# Patient Record
Sex: MALE | Race: WHITE | Employment: FULL TIME | URBAN - METROPOLITAN AREA
[De-identification: names, ages, dates, MRNs, and addresses within clinical notes are randomized per-mention and may not be internally consistent; named-entity substitution may affect disease eponyms.]

---

## 2017-02-01 DIAGNOSIS — R41.840 ATTENTION DEFICIT: ICD-10-CM

## 2017-02-01 RX ORDER — DEXTROAMPHETAMINE SACCHARATE, AMPHETAMINE ASPARTATE, DEXTROAMPHETAMINE SULFATE AND AMPHETAMINE SULFATE 7.5; 7.5; 7.5; 7.5 MG/1; MG/1; MG/1; MG/1
30 TABLET ORAL 2 TIMES DAILY
Qty: 60 TABLET | Refills: 0 | Status: SHIPPED | OUTPATIENT
Start: 2017-02-01 | End: 2017-03-04 | Stop reason: SDUPTHER

## 2017-02-20 RX ORDER — ZOLPIDEM TARTRATE 10 MG/1
TABLET ORAL
Qty: 30 TABLET | Refills: 0 | OUTPATIENT
Start: 2017-02-20

## 2017-03-02 DIAGNOSIS — R41.840 ATTENTION DEFICIT: ICD-10-CM

## 2017-03-02 RX ORDER — DEXTROAMPHETAMINE SACCHARATE, AMPHETAMINE ASPARTATE, DEXTROAMPHETAMINE SULFATE AND AMPHETAMINE SULFATE 7.5; 7.5; 7.5; 7.5 MG/1; MG/1; MG/1; MG/1
30 TABLET ORAL 2 TIMES DAILY
Qty: 60 TABLET | Refills: 0 | OUTPATIENT
Start: 2017-03-02

## 2017-03-04 ENCOUNTER — OFFICE VISIT (OUTPATIENT)
Dept: PRIMARY CARE CLINIC | Age: 37
End: 2017-03-04

## 2017-03-04 VITALS
HEIGHT: 68 IN | HEART RATE: 78 BPM | BODY MASS INDEX: 33.65 KG/M2 | WEIGHT: 222 LBS | DIASTOLIC BLOOD PRESSURE: 80 MMHG | TEMPERATURE: 98.2 F | RESPIRATION RATE: 16 BRPM | SYSTOLIC BLOOD PRESSURE: 122 MMHG

## 2017-03-04 DIAGNOSIS — R41.840 ATTENTION DEFICIT: ICD-10-CM

## 2017-03-04 PROCEDURE — 99213 OFFICE O/P EST LOW 20 MIN: CPT | Performed by: INTERNAL MEDICINE

## 2017-03-04 RX ORDER — DEXTROAMPHETAMINE SACCHARATE, AMPHETAMINE ASPARTATE, DEXTROAMPHETAMINE SULFATE AND AMPHETAMINE SULFATE 7.5; 7.5; 7.5; 7.5 MG/1; MG/1; MG/1; MG/1
30 TABLET ORAL 2 TIMES DAILY
Qty: 60 TABLET | Refills: 0 | Status: SHIPPED | OUTPATIENT
Start: 2017-03-04 | End: 2017-03-04 | Stop reason: SDUPTHER

## 2017-03-04 RX ORDER — ZOLPIDEM TARTRATE 10 MG/1
10 TABLET ORAL NIGHTLY PRN
Qty: 30 TABLET | Refills: 2 | Status: SHIPPED | OUTPATIENT
Start: 2017-03-04 | End: 2017-08-12 | Stop reason: SDUPTHER

## 2017-03-04 RX ORDER — DEXTROAMPHETAMINE SACCHARATE, AMPHETAMINE ASPARTATE, DEXTROAMPHETAMINE SULFATE AND AMPHETAMINE SULFATE 7.5; 7.5; 7.5; 7.5 MG/1; MG/1; MG/1; MG/1
30 TABLET ORAL 2 TIMES DAILY
Qty: 60 TABLET | Refills: 0 | Status: SHIPPED | OUTPATIENT
Start: 2017-03-04 | End: 2017-08-12 | Stop reason: SDUPTHER

## 2017-03-12 ASSESSMENT — ENCOUNTER SYMPTOMS
SHORTNESS OF BREATH: 0
NAUSEA: 0
PHOTOPHOBIA: 0
VOICE CHANGE: 0
CHOKING: 0
VOMITING: 0
TROUBLE SWALLOWING: 0

## 2017-03-23 ENCOUNTER — OFFICE VISIT (OUTPATIENT)
Dept: PRIMARY CARE CLINIC | Age: 37
End: 2017-03-23

## 2017-03-23 VITALS
SYSTOLIC BLOOD PRESSURE: 118 MMHG | BODY MASS INDEX: 33.04 KG/M2 | HEIGHT: 68 IN | HEART RATE: 72 BPM | DIASTOLIC BLOOD PRESSURE: 88 MMHG | TEMPERATURE: 97.7 F | RESPIRATION RATE: 14 BRPM | WEIGHT: 218 LBS

## 2017-03-23 DIAGNOSIS — R10.13 ABDOMINAL PAIN, EPIGASTRIC: ICD-10-CM

## 2017-03-23 DIAGNOSIS — M54.6 ACUTE MIDLINE THORACIC BACK PAIN: Primary | ICD-10-CM

## 2017-03-23 DIAGNOSIS — L71.9 ROSACEA: ICD-10-CM

## 2017-03-23 DIAGNOSIS — M54.6 ACUTE MIDLINE THORACIC BACK PAIN: ICD-10-CM

## 2017-03-23 LAB
ALBUMIN SERPL-MCNC: 4.7 G/DL (ref 3.9–4.9)
ALP BLD-CCNC: 81 U/L (ref 35–104)
ALT SERPL-CCNC: 83 U/L (ref 0–41)
AMYLASE: 60 U/L (ref 28–100)
ANION GAP SERPL CALCULATED.3IONS-SCNC: 13 MEQ/L (ref 7–13)
AST SERPL-CCNC: 30 U/L (ref 0–40)
BILIRUB SERPL-MCNC: 0.5 MG/DL (ref 0–1.2)
BUN BLDV-MCNC: 16 MG/DL (ref 6–20)
CALCIUM SERPL-MCNC: 9.6 MG/DL (ref 8.6–10.2)
CHLORIDE BLD-SCNC: 100 MEQ/L (ref 98–107)
CO2: 24 MEQ/L (ref 22–29)
CREAT SERPL-MCNC: 0.76 MG/DL (ref 0.7–1.2)
GFR AFRICAN AMERICAN: >60
GFR NON-AFRICAN AMERICAN: >60
GLOBULIN: 2.6 G/DL (ref 2.3–3.5)
GLUCOSE BLD-MCNC: 73 MG/DL (ref 74–109)
LIPASE: 47 U/L (ref 13–60)
POTASSIUM SERPL-SCNC: 4.3 MEQ/L (ref 3.5–5.1)
SODIUM BLD-SCNC: 137 MEQ/L (ref 132–144)
TOTAL PROTEIN: 7.3 G/DL (ref 6.4–8.1)

## 2017-03-23 PROCEDURE — 99214 OFFICE O/P EST MOD 30 MIN: CPT | Performed by: FAMILY MEDICINE

## 2017-03-23 RX ORDER — METHOCARBAMOL 500 MG/1
500 TABLET, FILM COATED ORAL EVERY EVENING
Qty: 30 TABLET | Refills: 3 | Status: SHIPPED | OUTPATIENT
Start: 2017-03-23 | End: 2017-04-22

## 2017-03-23 RX ORDER — METRONIDAZOLE 7.5 MG/G
GEL TOPICAL
Qty: 1 TUBE | Refills: 3 | Status: SHIPPED | OUTPATIENT
Start: 2017-03-23 | End: 2017-09-29

## 2017-03-23 ASSESSMENT — ENCOUNTER SYMPTOMS
DIARRHEA: 0
FACIAL SWELLING: 0
BACK PAIN: 1
STRIDOR: 0
NAUSEA: 0
PHOTOPHOBIA: 0
BOWEL INCONTINENCE: 0
EYE DISCHARGE: 0
CHEST TIGHTNESS: 0
EYE PAIN: 0
APNEA: 0
EYE REDNESS: 0
CHOKING: 0
WHEEZING: 0
CONSTIPATION: 0
ABDOMINAL PAIN: 0
COLOR CHANGE: 0

## 2017-06-06 DIAGNOSIS — R41.840 ATTENTION DEFICIT: ICD-10-CM

## 2017-06-06 RX ORDER — ZOLPIDEM TARTRATE 10 MG/1
10 TABLET ORAL NIGHTLY PRN
Qty: 30 TABLET | Refills: 2 | OUTPATIENT
Start: 2017-06-06

## 2017-06-06 RX ORDER — DEXTROAMPHETAMINE SACCHARATE, AMPHETAMINE ASPARTATE, DEXTROAMPHETAMINE SULFATE AND AMPHETAMINE SULFATE 7.5; 7.5; 7.5; 7.5 MG/1; MG/1; MG/1; MG/1
30 TABLET ORAL 2 TIMES DAILY
Qty: 60 TABLET | Refills: 0 | OUTPATIENT
Start: 2017-06-06

## 2017-08-12 ENCOUNTER — OFFICE VISIT (OUTPATIENT)
Dept: PRIMARY CARE CLINIC | Age: 37
End: 2017-08-12

## 2017-08-12 VITALS
WEIGHT: 217.8 LBS | TEMPERATURE: 98.6 F | BODY MASS INDEX: 33.01 KG/M2 | SYSTOLIC BLOOD PRESSURE: 118 MMHG | RESPIRATION RATE: 14 BRPM | HEIGHT: 68 IN | OXYGEN SATURATION: 98 % | DIASTOLIC BLOOD PRESSURE: 80 MMHG | HEART RATE: 77 BPM

## 2017-08-12 DIAGNOSIS — R41.840 ATTENTION DEFICIT: ICD-10-CM

## 2017-08-12 DIAGNOSIS — M79.7 FIBROMYALGIA: ICD-10-CM

## 2017-08-12 DIAGNOSIS — R07.81 RIB PAIN ON RIGHT SIDE: Primary | ICD-10-CM

## 2017-08-12 DIAGNOSIS — G56.01 CARPAL TUNNEL SYNDROME, RIGHT: ICD-10-CM

## 2017-08-12 PROCEDURE — 99214 OFFICE O/P EST MOD 30 MIN: CPT | Performed by: INTERNAL MEDICINE

## 2017-08-12 RX ORDER — DEXTROAMPHETAMINE SACCHARATE, AMPHETAMINE ASPARTATE, DEXTROAMPHETAMINE SULFATE AND AMPHETAMINE SULFATE 7.5; 7.5; 7.5; 7.5 MG/1; MG/1; MG/1; MG/1
30 TABLET ORAL 2 TIMES DAILY
Qty: 60 TABLET | Refills: 0 | Status: SHIPPED | OUTPATIENT
Start: 2017-08-12 | End: 2017-09-29 | Stop reason: SDUPTHER

## 2017-08-12 RX ORDER — DEXTROAMPHETAMINE SACCHARATE, AMPHETAMINE ASPARTATE, DEXTROAMPHETAMINE SULFATE AND AMPHETAMINE SULFATE 7.5; 7.5; 7.5; 7.5 MG/1; MG/1; MG/1; MG/1
30 TABLET ORAL 2 TIMES DAILY
Qty: 60 TABLET | Refills: 0 | Status: SHIPPED | OUTPATIENT
Start: 2017-08-12 | End: 2017-08-12 | Stop reason: SDUPTHER

## 2017-08-12 RX ORDER — PREGABALIN 100 MG/1
100 CAPSULE ORAL 2 TIMES DAILY
Qty: 60 CAPSULE | Refills: 2 | Status: SHIPPED | OUTPATIENT
Start: 2017-08-12 | End: 2017-09-29

## 2017-08-12 RX ORDER — ZOLPIDEM TARTRATE 10 MG/1
10 TABLET ORAL NIGHTLY PRN
Qty: 30 TABLET | Refills: 2 | Status: SHIPPED | OUTPATIENT
Start: 2017-08-12 | End: 2017-10-04 | Stop reason: SDUPTHER

## 2017-08-12 RX ORDER — LIDOCAINE 50 MG/G
1 PATCH TOPICAL DAILY
Qty: 30 PATCH | Refills: 1 | Status: SHIPPED | OUTPATIENT
Start: 2017-08-12 | End: 2017-10-09 | Stop reason: SDUPTHER

## 2017-08-12 RX ORDER — DEXTROAMPHETAMINE SACCHARATE, AMPHETAMINE ASPARTATE, DEXTROAMPHETAMINE SULFATE AND AMPHETAMINE SULFATE 7.5; 7.5; 7.5; 7.5 MG/1; MG/1; MG/1; MG/1
30 TABLET ORAL 2 TIMES DAILY
Qty: 60 TABLET | Refills: 0 | Status: SHIPPED | OUTPATIENT
Start: 2017-08-12 | End: 2017-10-04 | Stop reason: SDUPTHER

## 2017-08-12 ASSESSMENT — PATIENT HEALTH QUESTIONNAIRE - PHQ9
1. LITTLE INTEREST OR PLEASURE IN DOING THINGS: 0
2. FEELING DOWN, DEPRESSED OR HOPELESS: 0
SUM OF ALL RESPONSES TO PHQ QUESTIONS 1-9: 0
SUM OF ALL RESPONSES TO PHQ9 QUESTIONS 1 & 2: 0

## 2017-08-13 ASSESSMENT — ENCOUNTER SYMPTOMS
TROUBLE SWALLOWING: 0
VOMITING: 0
PHOTOPHOBIA: 0
VOICE CHANGE: 0
SHORTNESS OF BREATH: 0
CHOKING: 0
NAUSEA: 0

## 2017-09-29 ENCOUNTER — OFFICE VISIT (OUTPATIENT)
Dept: PHYSICAL MEDICINE AND REHAB | Age: 37
End: 2017-09-29

## 2017-09-29 ENCOUNTER — HOSPITAL ENCOUNTER (OUTPATIENT)
Dept: GENERAL RADIOLOGY | Age: 37
Discharge: HOME | End: 2017-09-29
Payer: COMMERCIAL

## 2017-09-29 VITALS
WEIGHT: 211 LBS | HEIGHT: 68 IN | DIASTOLIC BLOOD PRESSURE: 70 MMHG | SYSTOLIC BLOOD PRESSURE: 110 MMHG | BODY MASS INDEX: 31.98 KG/M2

## 2017-09-29 DIAGNOSIS — M54.40 CHRONIC LOW BACK PAIN WITH SCIATICA, SCIATICA LATERALITY UNSPECIFIED, UNSPECIFIED BACK PAIN LATERALITY: Primary | ICD-10-CM

## 2017-09-29 DIAGNOSIS — M54.2 NECK PAIN: ICD-10-CM

## 2017-09-29 DIAGNOSIS — G47.01 INSOMNIA SECONDARY TO CHRONIC PAIN: ICD-10-CM

## 2017-09-29 DIAGNOSIS — R10.13 ABDOMINAL PAIN, EPIGASTRIC: ICD-10-CM

## 2017-09-29 DIAGNOSIS — M54.17 LUMBOSACRAL RADICULOPATHY AT L5: ICD-10-CM

## 2017-09-29 DIAGNOSIS — G89.29 CHRONIC PAIN OF LEFT KNEE: ICD-10-CM

## 2017-09-29 DIAGNOSIS — F41.1 ANXIETY STATE: ICD-10-CM

## 2017-09-29 DIAGNOSIS — G89.29 CHRONIC BILATERAL THORACIC BACK PAIN: ICD-10-CM

## 2017-09-29 DIAGNOSIS — G54.0 TOS (THORACIC OUTLET SYNDROME): ICD-10-CM

## 2017-09-29 DIAGNOSIS — M25.562 CHRONIC PAIN OF LEFT KNEE: ICD-10-CM

## 2017-09-29 DIAGNOSIS — Z79.899 MEDICAL MARIJUANA USE: ICD-10-CM

## 2017-09-29 DIAGNOSIS — E55.9 VITAMIN D DEFICIENCY: ICD-10-CM

## 2017-09-29 DIAGNOSIS — M51.36 DDD (DEGENERATIVE DISC DISEASE), LUMBAR: ICD-10-CM

## 2017-09-29 DIAGNOSIS — M54.6 CHRONIC BILATERAL THORACIC BACK PAIN: ICD-10-CM

## 2017-09-29 DIAGNOSIS — G89.29 INSOMNIA SECONDARY TO CHRONIC PAIN: ICD-10-CM

## 2017-09-29 DIAGNOSIS — M54.40 CHRONIC LOW BACK PAIN WITH SCIATICA, SCIATICA LATERALITY UNSPECIFIED, UNSPECIFIED BACK PAIN LATERALITY: ICD-10-CM

## 2017-09-29 DIAGNOSIS — Z79.899 HIGH RISK MEDICATION USE: ICD-10-CM

## 2017-09-29 DIAGNOSIS — G89.29 CHRONIC LOW BACK PAIN WITH SCIATICA, SCIATICA LATERALITY UNSPECIFIED, UNSPECIFIED BACK PAIN LATERALITY: Primary | ICD-10-CM

## 2017-09-29 DIAGNOSIS — M53.3 SI (SACROILIAC) PAIN: ICD-10-CM

## 2017-09-29 DIAGNOSIS — F98.8 ATTENTION DEFICIT DISORDER: ICD-10-CM

## 2017-09-29 DIAGNOSIS — G89.29 CHRONIC LOW BACK PAIN WITH SCIATICA, SCIATICA LATERALITY UNSPECIFIED, UNSPECIFIED BACK PAIN LATERALITY: ICD-10-CM

## 2017-09-29 LAB
HCT VFR BLD CALC: 45.4 % (ref 42–52)
HEMOGLOBIN: 15.2 G/DL (ref 14–18)
MCH RBC QN AUTO: 29.6 PG (ref 27–31.3)
MCHC RBC AUTO-ENTMCNC: 33.5 % (ref 33–37)
MCV RBC AUTO: 88.3 FL (ref 80–100)
PDW BLD-RTO: 13.1 % (ref 11.5–14.5)
PLATELET # BLD: 287 K/UL (ref 130–400)
RBC # BLD: 5.14 M/UL (ref 4.7–6.1)
VITAMIN D 25-HYDROXY: 21.4 NG/ML (ref 30–100)
WBC # BLD: 8.5 K/UL (ref 4.8–10.8)

## 2017-09-29 PROCEDURE — 96372 THER/PROPH/DIAG INJ SC/IM: CPT | Performed by: PHYSICAL MEDICINE & REHABILITATION

## 2017-09-29 PROCEDURE — 72100 X-RAY EXAM L-S SPINE 2/3 VWS: CPT

## 2017-09-29 PROCEDURE — 72070 X-RAY EXAM THORAC SPINE 2VWS: CPT

## 2017-09-29 PROCEDURE — 72040 X-RAY EXAM NECK SPINE 2-3 VW: CPT

## 2017-09-29 PROCEDURE — 99205 OFFICE O/P NEW HI 60 MIN: CPT | Performed by: PHYSICAL MEDICINE & REHABILITATION

## 2017-09-29 RX ORDER — FLUTICASONE PROPIONATE 50 MCG
2 SPRAY, SUSPENSION (ML) NASAL NIGHTLY
COMMUNITY
Start: 2017-09-20 | End: 2017-11-03 | Stop reason: ALTCHOICE

## 2017-09-29 RX ORDER — KETOROLAC TROMETHAMINE 30 MG/ML
30 INJECTION, SOLUTION INTRAMUSCULAR; INTRAVENOUS ONCE
Status: COMPLETED | OUTPATIENT
Start: 2017-09-29 | End: 2017-09-29

## 2017-09-29 RX ORDER — AMOXICILLIN 875 MG/1
875 TABLET, COATED ORAL
COMMUNITY
Start: 2017-09-20 | End: 2017-09-30

## 2017-09-29 RX ORDER — TOPIRAMATE 25 MG/1
TABLET ORAL
Qty: 60 TABLET | Refills: 3 | Status: SHIPPED | OUTPATIENT
Start: 2017-09-29 | End: 2018-01-03

## 2017-09-29 RX ORDER — METHOCARBAMOL 500 MG/1
500 TABLET, FILM COATED ORAL 2 TIMES DAILY PRN
COMMUNITY
End: 2017-11-03 | Stop reason: ALTCHOICE

## 2017-09-29 RX ORDER — BENZONATATE 100 MG/1
100-200 CAPSULE ORAL
COMMUNITY
Start: 2017-09-20 | End: 2017-11-03 | Stop reason: ALTCHOICE

## 2017-09-29 RX ADMIN — KETOROLAC TROMETHAMINE 30 MG: 30 INJECTION, SOLUTION INTRAMUSCULAR; INTRAVENOUS at 12:09

## 2017-09-29 ASSESSMENT — ENCOUNTER SYMPTOMS
NAUSEA: 0
SHORTNESS OF BREATH: 0
CONSTIPATION: 0
ABDOMINAL PAIN: 0
APNEA: 0
VOMITING: 0
BACK PAIN: 1
WHEEZING: 0
EYES NEGATIVE: 1
DIARRHEA: 0
CHEST TIGHTNESS: 0

## 2017-09-29 NOTE — MR AVS SNAPSHOT
After Visit Summary             Matt Cifuentes   2017 10:30 AM   Office Visit    Description:  Male : 1980   Provider:  Dylan Dickerson DO   Department:  [facility]              Your Follow-Up and Future Appointments         Below is a list of your follow-up and future appointments. This may not be a complete list as you may have made appointments directly with providers that we are not aware of or your providers may have made some for you. Please call your providers to confirm appointments. It is important to keep your appointments. Please bring your current insurance card, photo ID, co-pay, and all medication bottles to your appointment. If self-pay, payment is expected at the time of service. Your To-Do List     Future Appointments Provider Department Dept Phone    10/2/2017 1:00 PM Taisha Bueno [facility] [phone]    10/17/2017 10:45 AM Dylan Dickerson DO [facility] [phone]    2017 11:00 AM Dylan Dickerson DO [facility] [phone]    11/3/2017 9:30 AM Dylan Dickerson DO [facility] [phone]    Please arrive 15 minutes prior to appointment time, bring insurance card and photo ID.      Future Orders Complete By Expires    CBC [TWE555 Custom]  2017    HLA-B27 Antigen [[id number] Custom]  2017    MRI LUMBAR SPINE WO CONTRAST [40874 Custom]  2017    Pain Management Drug Screen [[id number] Custom]  2017    Protein Electrophoresis, Serum [[id number] Custom]  2017    XR CERVICAL SPINE (2-3 VIEWS) [[id number] Custom]  2017    XR LUMBAR SPINE (2-3 VIEWS) [[id number] Custom]  2017    XR THORACIC SPINE (2 VIEWS) [39133 Custom]  2017    Vitamin D 25 Hydroxy [HGB455 Custom]  2017    Comments: Please have this blood test done two months from when it was ordered-  Have your blood drawn approximately two months from the order date above (in the top right corner of this lab slip). Information from Your Visit        Department     Name Address Phone Fax    [facility] [address] [facility] [id number] [phone]      You Were Seen for:         Comments    Chronic low back pain with sciatica, sciatica laterality unspecified, unspecified back pain laterality   [[id number]]         Vital Signs     Blood Pressure Height Weight Body Mass Index Smoking Status       110/70 5' 8\" (1.727 m) 211 lb (95.7 kg) 32.08 kg/m2 Former Smoker       Additional Information about your Body Mass Index (BMI)           Your BMI as listed above is considered obese (30 or more). BMI is an estimate of body fat, calculated from your height and weight. The higher your BMI, the greater your risk of heart disease, high blood pressure, type 2 diabetes, stroke, gallstones, arthritis, sleep apnea, and certain cancers. BMI is not perfect. It may overestimate body fat in athletes and people who are more muscular. Even a small weight loss (between 5 and 10 percent of your current weight) by decreasing your calorie intake and becoming more physically active will help lower your risk of developing or worsening diseases associated with obesity. Learn more at: Adomik.co.uk             Today's Medication Changes          These changes are accurate as of: 9/29/17 12:01 PM.  If you have any questions, ask your nurse or doctor. START taking these medications           topiramate 25 MG tablet   Commonly known as:  TOPAMAX   Instructions:   Take one or two nightly as tolerated for pain   Quantity:  60 tablet   Refills:  3   Started by:  Michelle Verdin, DO       traMADol-acetaminophen 37.5-325 MG per tablet   Commonly known as:  ULTRACET 2-3 TIMES A WEEK FOR 6-8 WEEKS    ALWAYS TRANSITION TO A HOME EXERCISE PROGRAM WITH WRITTEN AND VERBAL INSTRUCTIONS. Comments:    CALL [phone] FOR AN APPOINTMENT  Please schedule with [facility] at the [facility] [place] [facility] [address]. Send over the lab, xrays, and a copy of my last note. Additional Information        Basic Information     Date Of Birth Sex Race Ethnicity Preferred Language    1980 Male White Non-/Non  English      Problem List as of 9/29/2017  Date Reviewed: 9/29/2017                Back pain OARRS PM&R 5/17/17, 09/28/17 OARRS PM&R    Chronic pain of left knee    Neck pain    DDD (degenerative disc disease), lumbar    TOS (thoracic outlet syndrome)    Abdominal pain, epigastric    Attention deficit    Anxiety state    Attention deficit disorder      Immunizations as of 9/29/2017     Name Date    Influenza Virus Vaccine 11/5/2014    Influenza, [place], 3 yrs and older, IM, Preservative Free 8/10/2016    PPD Test 3/4/2013      Preventive Care        Date Due    HIV screening is recommended for all people regardless of risk factors  aged 15-65 years at least once (lifetime) who have never been HIV tested. 5/9/1994    Tetanus Combination Vaccine (1 - Tdap) 5/9/1998    Yearly Flu Vaccine (1) 9/1/2017            DocSea Signup           DocSea allows you to send messages to your doctor, view your test results, renew your prescriptions, schedule appointments, view visit notes, and more. How Do I Sign Up? 1. In your Internet browser, go to [email or web address]  2. Click on the Sign Up Now link in the Sign In box. You will see the New Member Sign Up page. 3. Enter your DocSea Access Code exactly as it appears below. You will not need to use this code after youve completed the sign-up process. If you do not sign up before the expiration date, you must request a new code.   DocSea Access Code: [email or web address]  Expires: 11/28/2017  9:44 AM

## 2017-09-29 NOTE — PROGRESS NOTES
Subjective  Jesica Bernal, 44 y.o. male presents today with:    Establish Care Patient here for evaluation and treatment plan/options for neck and upper back pain. Back Pain     Neck Pain     Numbness down left leg       Neck Pain    This is a chronic problem. The current episode started more than 1 year ago. The problem occurs constantly. The pain is associated with nothing. The pain is present in the occipital region. The quality of the pain is described as aching. The pain is at a severity of 8/10. The pain is severe. The symptoms are aggravated by bending. Stiffness is present in the morning. Associated symptoms include numbness, tingling and weakness. Pertinent negatives include no chest pain or headaches. He has tried chiropractic manipulation, heat, home exercises, acetaminophen, ice and NSAIDs for the symptoms. The treatment provided mild relief. Back Pain   This is a chronic problem. The current episode started more than 1 year ago. The problem occurs constantly. The problem is unchanged. The pain is present in the gluteal. The pain radiates to the left knee, left thigh and left foot. The pain is at a severity of 8/10. The pain is severe. The pain is worse during the day. The symptoms are aggravated by bending, sitting, twisting and standing. Associated symptoms include numbness, tingling and weakness. Pertinent negatives include no abdominal pain, chest pain or headaches. Risk factors include lack of exercise, obesity, poor posture and sedentary lifestyle. He has tried analgesics, heat, chiropractic manipulation, home exercises and NSAIDs for the symptoms. The treatment provided mild relief.        Past Medical History:   Diagnosis Date    Abdominal pain, epigastric 3/23/2017    ADHD (attention deficit hyperactivity disorder)     Anxiety state, unspecified     [facility] ([facility]     Carpal tunnel syndrome, bilateral     emg pos    Fibromyalgia     dr Rufus Brooke    Insomnia, swelling, no edema, no deformity, no laceration and normal pulse. Right upper arm: Normal.        Left upper arm: Normal.        Right forearm: Normal.        Left forearm: Normal.        Right hand: Normal.        Left hand: Normal.        Right upper leg: Normal.        Left upper leg: Normal.        Right lower leg: Normal.        Left lower leg: Normal.        Right foot: Normal.        Left foot: Normal.   Tender areas are indicated by numbered spot         Lymphadenopathy:     He has no cervical adenopathy. He has no axillary adenopathy. Right: No inguinal adenopathy present. Left: No inguinal adenopathy present. Neurological: He is alert and oriented to person, place, and time. He has normal strength. He displays abnormal reflex. He displays no atrophy and no tremor. A sensory deficit is present. No cranial nerve deficit. He exhibits normal muscle tone. Gait abnormal. Coordination normal. He displays no Babinski's sign on the right side. He displays no Babinski's sign on the left side. Reflex Scores:       Tricep reflexes are 2+ on the right side and 2+ on the left side. Bicep reflexes are 2+ on the right side and 2+ on the left side. Brachioradialis reflexes are 2+ on the right side and 2+ on the left side. Patellar reflexes are 2+ on the right side and 2+ on the left side. Achilles reflexes are 1+ on the right side and 1+ on the left side. Skin: Skin is warm, dry and intact. No abrasion, no bruising, no ecchymosis, no laceration, no petechiae and no rash noted. Rash is not macular, not pustular and not urticarial. He is not diaphoretic. No cyanosis or erythema. No pallor. Nails show no clubbing. Psychiatric: He has a normal mood and affect. His behavior is normal. Judgment and thought content normal. His mood appears not anxious. His affect is not angry, not blunt, not labile and not inappropriate.  His speech is not rapid and/or pressured, not delayed, 5. Abdominal pain, epigastric     6. Insomnia secondary to chronic pain     7. Neck pain  XR CERVICAL SPINE (2-3 VIEWS)   8. DDD (degenerative disc disease), lumbar  XR LUMBAR SPINE (2-3 VIEWS)    Protein Electrophoresis, Serum    HLA-B27 Antigen    OT manual therapy   9. High risk medication use  Pain Management Drug Screen   10. Vitamin D deficiency  Vitamin D 25 Hydroxy    OT manual therapy   11. Chronic bilateral thoracic back pain  XR THORACIC SPINE (2 VIEWS)    OT manual therapy    traMADol-acetaminophen (ULTRACET) 37.5-325 MG per tablet   12. Lumbosacral radiculopathy at L5  MRI LUMBAR SPINE WO CONTRAST    Protein Electrophoresis, Serum    HLA-B27 Antigen    OT manual therapy    topiramate (TOPAMAX) 25 MG tablet    traMADol-acetaminophen (ULTRACET) 37.5-325 MG per tablet   13. TOS (thoracic outlet syndrome)  OT manual therapy    topiramate (TOPAMAX) 25 MG tablet    traMADol-acetaminophen (ULTRACET) 37.5-325 MG per tablet       I am also concerned by lifestyle and mood issues including:    Past Medical History:   Diagnosis Date    Abdominal pain, epigastric 3/23/2017    ADHD (attention deficit hyperactivity disorder)     Anxiety state, unspecified     [facility]     Carpal tunnel syndrome, bilateral     emg pos    Fibromyalgia     dr Ashley Ruano    Insomnia, unspecified            Given their medication, chronic pain and lifestyle and medications they are at risk for :    Falls, constipation, addiction  Loss of livelyhood due to severe pain, debility, weight gain and  vitamin D deficiency    The patient was educated regarding proper diet, fitness routine, and regulatory restrictions concerning pain medications. Previous notes, comprehensive past medical, surgical, family history, and diagnostics were reviewed. Patient education and councelling were provided regarding off label use,treatment options and medication and injection risks.     Current and old [id number] [place] [address] Automated Prescription Reporting System) records reviewed, all refills reviewed since last visit,  Behavioral agreement/[facility] regulations   and Toxicology screen was reviewed with patient and is up to date. There are no current red flags. They are making good progress regarding pain relief, they are performing at a functional level regarding activities of daily living and psychological functioning, they're not having any adverse effects or side effects from the current medications, and I see no findings of aberrant drug taking her addiction related behaviors. Attestation: The Prescription Monitoring Report for this patient was reviewed today. (Maria Antonia Pagan DO)  Documentation: Possible medication side effects, risk of tolerance and/or dependence, and alternative treatments discussed., No signs of potential drug abuse or diversion identified. (Maria Antonia Pagan DO)       Patient is currently taking:       I have discontinued Mr. Pantoja's metroNIDAZOLE and pregabalin. I am also having him start on topiramate and traMADol-acetaminophen. Additionally, I am having him maintain his lidocaine, zolpidem, amphetamine-dextroamphetamine, amoxicillin, benzonatate, fluticasone, and methocarbamol. I also recommend the following Medications:    Orders Placed This Encounter   Medications    topiramate (TOPAMAX) 25 MG tablet     Sig: Take one or two nightly as tolerated for pain     Dispense:  60 tablet     Refill:  3    traMADol-acetaminophen (ULTRACET) 37.5-325 MG per tablet     Sig: Take one half or one tablet every 6 hours as needed for pain. Maximum of 2 tablets daily. Do not get narcotic pain medication from any other providers. Generic equivalent acceptable, unless otherwise noted. Dispense:  40 tablet     Refill:  0        -which helps with pain and function. Otherwise, continue the current pain medications that I have prescibed.       Radiologic:   Old films reviewed   EXAMINATION LUMBOSACRAL SPINE, 2 VIEWS       CLINICAL HISTORY LOW BACK PAIN       COMPARISONS None available.       FINDINGS AP and lateral views of the lumbosacral spine demonstrate a   grade 1 spondylolisthesis of L5 on S1. There is narrowing of the L5/S1   disc space compatible with degenerative disc disease at this level. There are no bone erosions, osteolytic or osteoblastic lesions   involving the lumbar spine.       IMPRESSION    1. GRADE 1 SPONDYLOLISTHESIS OF L5 ON S1.       2. NARROWED L5/S1 DISC SPACE SUGGESTING DEGENERATIVE DISC DISEASE AT   L5/S1.            -  RADWHERE        Read By- Megan Chinchilla Ip   Released By- Megan Chinchilla Ip   Released Date Time- 08/13/15 1154     ,  EXAMINATION CERVICAL SPINE, FOUR VIEWS       CLINICAL HISTORY NECK PAIN.       COMPARISONS 1/4/07.       FINDINGS There is a normal lordotic cervical curvature. No fracture or   other acute bone lesion is evident. There is very slight anterior   hypertrophic lipping at the anterior inferior aspect of C5. Disc space   heights are maintained.       On lateral projection, C7 is partially obscured by the shoulders. A   swimmer's view was obtained, but is compromised, due to motion   artifact.       IMPRESSION        NEGATIVE CERVICAL SPINE. I discussed results with patients. see Follow up plans below  For any new studies. Care Everywhere Updates:  requested and reviewed. No new issues noted. Electrodiagnostic:  Previous studies requested     I discussed results with patient. See follow-up plans for new studies.         Labs:  Previous labs reviewed     Lab Results   Component Value Date     03/23/2017    K 4.3 03/23/2017     03/23/2017    CO2 24 03/23/2017    BUN 16 03/23/2017    CREATININE 0.76 03/23/2017    CALCIUM 9.6 03/23/2017    LABALBU 4.7 03/23/2017    BILITOT 0.5 03/23/2017    ALKPHOS 81 03/23/2017    AST 30 03/23/2017    ALT 83 03/23/2017     Lab Results Component Value Date    WBC 9.0 07/14/2016    RBC 5.30 07/14/2016    HGB 15.5 07/14/2016    HCT 46.2 07/14/2016    MCV 87.2 07/14/2016    MCH 29.3 07/14/2016    MCHC 33.6 07/14/2016    RDW 13.0 07/14/2016     07/14/2016       Lab Results   Component Value Date    LABAMPH POSITIVE 07/14/2016    BARBSCNU Neg 07/14/2016    LABBENZ Neg 07/14/2016    CANSU Neg 07/14/2016    COCAIMETSCRU Neg 07/14/2016    PHENCYCLIDINESCREENURINE Neg 07/14/2016    DSCOMMENT see below 07/14/2016       No results found for: CODEINE, MORPHINE, ACETYLMORPHI, OXYCODONE, NOROXYCODONE, NOROXYMU, HYDRCO, NORHYDU, HYDROMO, BUPREN, NORBUPRNOR, FENTA, NORFENT, MEPERIDINE, TAPENU, TAPOSULFUR, METHADONE, LABPROP, TRAM, AMPH, METHAMP, MDMA, ECMDA    No results found for: METPHEN, PHENTERMINE, BENZOYL, ALPRAZ, ALPHAOHALPRA, CLONAZEPAM, 7AMINOCLONAZ, DIAZEP, ROSENDO, OXAZ, TEMAZ, LORAZEPAM, MIDAZOLAM, ZOLPIDEM, CORI, ETG, MARIJMET, PCP, PAINMGTDRUGP, EERPAINMGTPA, LABCREA      , I discussed results with patient. See follow-up plans for new studies. Therapies:  HEP-gentle stretching and relaxation techniques-demonstrated with patient-they are to do them twice a day. They are also advised to make the following lifestyle changes:  Goals     None          Injections or Epidurals:  Injection options were discussed. Patient gave verbal consent to ordered injections. See follow-up plans for planned injections. Supplements:  Vitamin D,   Education was given on:   Dietary and Fitness             Follow up with Primary Care Physician regarding their general medical needs. They are to follow up in 2 months to review medication, efficacy of injections, pill counts, OARRS check, SOAPPR assessment, review diagnostics, to review previous and future treatment plans and assess appropriateness for continued therapy.         New Diagnostics  Orders Placed This Encounter   Procedures    XR CERVICAL SPINE (2-3 VIEWS)    XR LUMBAR SPINE (2-3 VIEWS)    XR THORACIC SPINE (2 VIEWS)    MRI LUMBAR SPINE WO CONTRAST    CBC    Pain Management Drug Screen    Vitamin D 25 Hydroxy    Protein Electrophoresis, Serum    HLA-B27 Antigen    OT manual therapy       Archana Phipps DO

## 2017-10-01 LAB
6-ACETYLMORPHINE: NOT DETECTED
7-AMINOCLONAZEPAM: NOT DETECTED
ALBUMIN SERPL-MCNC: 4.61 G/DL (ref 3.75–5.01)
ALPHA-1-GLOBULIN: 0.33 G/DL (ref 0.19–0.46)
ALPHA-2-GLOBULIN: 0.67 G/DL (ref 0.48–1.05)
ALPHA-OH-ALPRAZOLAM: NOT DETECTED
ALPRAZOLAM: NOT DETECTED
AMPHETAMINE: PRESENT
BARBITURATES: NOT DETECTED
BENZOYLECGONINE: NOT DETECTED
BETA GLOBULIN: 1.13 G/DL (ref 0.48–1.1)
BUPRENORPHINE: NOT DETECTED
CARISOPRODOL: NOT DETECTED
CLONAZEPAM: NOT DETECTED
CODEINE: NOT DETECTED
CREATININE URINE: 132.6 MG/DL (ref 20–400)
DIAZEPAM: NOT DETECTED
EER PAIN MGT DRUG PANEL, HIGH RES/EMIT U: NORMAL
ETHYL GLUCURONIDE: NOT DETECTED
FENTANYL: NOT DETECTED
GAMMA GLOBULIN: 1.15 G/DL (ref 0.62–1.51)
HLA B27: POSITIVE
HYDROCODONE: NOT DETECTED
HYDROMORPHONE: NOT DETECTED
LORAZEPAM: NOT DETECTED
MARIJUANA METABOLITE: PRESENT
MDA: NOT DETECTED
MDEA: NOT DETECTED
MDMA URINE: NOT DETECTED
MEPERIDINE: NOT DETECTED
METHADONE: NOT DETECTED
METHAMPHETAMINE: NOT DETECTED
METHYLPHENIDATE: NOT DETECTED
MIDAZOLAM: NOT DETECTED
MORPHINE: NOT DETECTED
NORBUPRENORPHINE, FREE: NOT DETECTED
NORDIAZEPAM: NOT DETECTED
NORFENTANYL: NOT DETECTED
NORHYDROCODONE, URINE: NOT DETECTED
NOROXYCODONE: NOT DETECTED
NOROXYMORPHONE, URINE: NOT DETECTED
OXAZEPAM: NOT DETECTED
OXYCODONE: NOT DETECTED
OXYMORPHONE: NOT DETECTED
PAIN MANAGEMENT DRUG PANEL: NORMAL
PCP: NOT DETECTED
PHENTERMINE: NOT DETECTED
PROPOXYPHENE: NOT DETECTED
PROTEIN ELECTROPHORESIS, SERUM: ABNORMAL
SPE/IFE INTERPRETATION: ABNORMAL
TAPENTADOL, URINE: NOT DETECTED
TAPENTADOL-O-SULFATE, URINE: NOT DETECTED
TEMAZEPAM: NOT DETECTED
TOTAL PROTEIN: 7.9 G/DL (ref 6–8.3)
TRAMADOL: NOT DETECTED
ZOLPIDEM: NOT DETECTED

## 2017-10-02 ENCOUNTER — PROCEDURE VISIT (OUTPATIENT)
Dept: PHYSICAL MEDICINE AND REHAB | Age: 37
End: 2017-10-02

## 2017-10-02 DIAGNOSIS — M54.32 SCIATICA OF LEFT SIDE: Primary | ICD-10-CM

## 2017-10-02 PROCEDURE — 64445 NJX AA&/STRD SCIATIC NRV IMG: CPT | Performed by: PHYSICAL MEDICINE & REHABILITATION

## 2017-10-04 ENCOUNTER — OFFICE VISIT (OUTPATIENT)
Dept: PRIMARY CARE CLINIC | Age: 37
End: 2017-10-04

## 2017-10-04 VITALS
HEIGHT: 68 IN | WEIGHT: 211 LBS | TEMPERATURE: 97.6 F | RESPIRATION RATE: 16 BRPM | HEART RATE: 72 BPM | DIASTOLIC BLOOD PRESSURE: 82 MMHG | BODY MASS INDEX: 31.98 KG/M2 | SYSTOLIC BLOOD PRESSURE: 118 MMHG

## 2017-10-04 DIAGNOSIS — R41.840 ATTENTION DEFICIT: Primary | ICD-10-CM

## 2017-10-04 DIAGNOSIS — G47.00 INSOMNIA, UNSPECIFIED TYPE: ICD-10-CM

## 2017-10-04 DIAGNOSIS — Z23 NEED FOR INFLUENZA VACCINATION: ICD-10-CM

## 2017-10-04 PROCEDURE — 90688 IIV4 VACCINE SPLT 0.5 ML IM: CPT | Performed by: INTERNAL MEDICINE

## 2017-10-04 PROCEDURE — 99213 OFFICE O/P EST LOW 20 MIN: CPT | Performed by: INTERNAL MEDICINE

## 2017-10-04 PROCEDURE — 90471 IMMUNIZATION ADMIN: CPT | Performed by: INTERNAL MEDICINE

## 2017-10-04 RX ORDER — DEXTROAMPHETAMINE SACCHARATE, AMPHETAMINE ASPARTATE, DEXTROAMPHETAMINE SULFATE AND AMPHETAMINE SULFATE 7.5; 7.5; 7.5; 7.5 MG/1; MG/1; MG/1; MG/1
30 TABLET ORAL 2 TIMES DAILY
Qty: 60 TABLET | Refills: 0 | Status: SHIPPED | OUTPATIENT
Start: 2017-10-04 | End: 2017-10-04 | Stop reason: SDUPTHER

## 2017-10-04 RX ORDER — DEXTROAMPHETAMINE SACCHARATE, AMPHETAMINE ASPARTATE, DEXTROAMPHETAMINE SULFATE AND AMPHETAMINE SULFATE 7.5; 7.5; 7.5; 7.5 MG/1; MG/1; MG/1; MG/1
30 TABLET ORAL 2 TIMES DAILY
Qty: 60 TABLET | Refills: 0 | Status: SHIPPED | OUTPATIENT
Start: 2017-10-04 | End: 2017-11-03 | Stop reason: SDUPTHER

## 2017-10-04 RX ORDER — ZOLPIDEM TARTRATE 10 MG/1
10 TABLET ORAL NIGHTLY PRN
Qty: 30 TABLET | Refills: 2 | Status: SHIPPED | OUTPATIENT
Start: 2017-10-04 | End: 2017-10-09 | Stop reason: SDUPTHER

## 2017-10-04 NOTE — PROGRESS NOTES
Dilma Schultz 44 y.o. male presents today with   Chief Complaint   Patient presents with    Results     Presents today for f/u on X-ray results done on 9/29/17       Mental Health Problem   The primary symptoms include dysphoric mood. The current episode started more than 1 month ago. This is a recurrent problem. The onset of the illness is precipitated by emotional stress. The degree of incapacity that he is experiencing as a consequence of his illness is mild. Additional symptoms of the illness include anhedonia, insomnia, agitation and distractible. Additional symptoms of the illness do not include fatigue. He does not admit to suicidal ideas. Risk factors that are present for mental illness include a history of mental illness.        Past Medical History:   Diagnosis Date    Abdominal pain, epigastric 3/23/2017    ADHD (attention deficit hyperactivity disorder)     Anxiety state, unspecified     Bipolar disorder, unspecified ([address]     Carpal tunnel syndrome, bilateral     emg pos    Fibromyalgia     dr Sruthi An    Insomnia, unspecified      Patient Active Problem List    Diagnosis Date Noted    Back pain OARRS PM&R 5/17/17, 09/28/17 OARRS PM&R 06/06/2013     Priority: High    Chronic pain of left knee 09/29/2017    Neck pain 09/29/2017    DDD (degenerative disc disease), lumbar 09/29/2017    TOS (thoracic outlet syndrome) 09/29/2017    Medical marijuana use 09/29/2017    Abdominal pain, epigastric 03/23/2017    Attention deficit 03/26/2014    Anxiety state 06/06/2013    Attention deficit disorder 06/06/2013     Past Surgical History:   Procedure Laterality Date    HERNIA REPAIR       Family History   Problem Relation Age of Onset    Cancer Father      Social History     Social History    Marital status:      Spouse name: Henrique Arias Number of children: 3    Years of education: 15     Occupational History    Security      Social History Main Topics    Smoking status: Former Smoker Years: 10.00     Quit date: 2005    Smokeless tobacco: Never Used    Alcohol use Yes      Comment: occasional    Drug use: No    Sexual activity: Not Asked     Other Topics Concern    None     Social History Narrative    Patient lives in a split level home with his wife and 3 children. Youngest child was diagnoses with Autism Epilepsy  around 9830-5875. Does do a lot of lifting of the youngest son. ADL's without any assistance. No Known Allergies    Review of Systems   Constitutional: Negative for fatigue and fever. HENT: Negative for mouth sores. Eyes: Negative for photophobia and visual disturbance. Respiratory: Negative for choking and shortness of breath. Cardiovascular: Negative for chest pain and palpitations. Gastrointestinal: Negative for abdominal distention. Genitourinary: Negative for urgency. Skin: Negative for rash. Neurological: Negative for tremors and syncope. Hematological: Does not bruise/bleed easily. Psychiatric/Behavioral: Positive for agitation and dysphoric mood. Negative for suicidal ideas. The patient has insomnia. Vitals:    10/04/17 0956   BP: 118/82   Site: Right Arm   Position: Sitting   Cuff Size: Medium Adult   Pulse: 72   Resp: 16   Temp: 97.6 °F (36.4 °C)   TempSrc: Tympanic   Weight: 211 lb (95.7 kg)   Height: 5' 8\" (1.727 m)       Physical Exam   Constitutional: He appears well-developed and well-nourished. HENT:   Head: Normocephalic. Eyes: Conjunctivae and EOM are normal. Pupils are equal, round, and reactive to light. Neck: Normal range of motion. No thyromegaly present. Cardiovascular: Normal rate and regular rhythm. Pulmonary/Chest: Effort normal. No respiratory distress. He has no wheezes. Abdominal: Soft. Bowel sounds are normal.   Musculoskeletal: Normal range of motion. Neurological: He is alert. Skin: Skin is dry. \    Assessment/Plan  Zhang Zamora was seen today for results.     Diagnoses and all orders for this visit:    Attention deficit  -     Discontinue: amphetamine-dextroamphetamine (ADDERALL, 30MG,) 30 MG tablet; Take 1 tablet by mouth 2 times daily . -     amphetamine-dextroamphetamine (ADDERALL, 30MG,) 30 MG tablet; Take 1 tablet by mouth 2 times daily . Insomnia, unspecified type  -     zolpidem (AMBIEN) 10 MG tablet; Take 1 tablet by mouth nightly as needed for Sleep    Need for influenza vaccination  -     INFLUENZA, QUADV, 3 YRS AND OLDER, IM, MDV, 0.5ML (FLUZONE QUADV)        No Follow-up on file.     Nadja Cardona MD

## 2017-10-08 ASSESSMENT — ENCOUNTER SYMPTOMS
PHOTOPHOBIA: 0
ABDOMINAL DISTENTION: 0
SHORTNESS OF BREATH: 0
CHOKING: 0

## 2017-10-09 DIAGNOSIS — G47.00 INSOMNIA, UNSPECIFIED TYPE: ICD-10-CM

## 2017-10-09 DIAGNOSIS — R07.81 RIB PAIN ON RIGHT SIDE: ICD-10-CM

## 2017-10-09 RX ORDER — ZOLPIDEM TARTRATE 10 MG/1
10 TABLET ORAL NIGHTLY PRN
Qty: 30 TABLET | Refills: 2 | Status: SHIPPED | OUTPATIENT
Start: 2017-10-09 | End: 2017-11-03 | Stop reason: SDUPTHER

## 2017-10-09 RX ORDER — LIDOCAINE 50 MG/G
1 PATCH TOPICAL DAILY
Qty: 30 PATCH | Refills: 1 | Status: SHIPPED | OUTPATIENT
Start: 2017-10-09 | End: 2019-03-13

## 2017-10-10 ENCOUNTER — HOSPITAL ENCOUNTER (OUTPATIENT)
Dept: MRI IMAGING | Age: 37
Discharge: HOME | End: 2017-10-10
Payer: COMMERCIAL

## 2017-10-10 DIAGNOSIS — M54.17 LUMBOSACRAL RADICULOPATHY AT L5: ICD-10-CM

## 2017-10-10 PROCEDURE — 72148 MRI LUMBAR SPINE W/O DYE: CPT

## 2017-10-12 PROBLEM — M54.16 LUMBAR RADICULOPATHY: Status: ACTIVE | Noted: 2017-10-12

## 2017-10-17 ENCOUNTER — PROCEDURE VISIT (OUTPATIENT)
Dept: PHYSICAL MEDICINE AND REHAB | Age: 37
End: 2017-10-17

## 2017-10-17 DIAGNOSIS — M79.7 FIBROMYALGIA: Primary | ICD-10-CM

## 2017-10-17 PROCEDURE — 20553 NJX 1/MLT TRIGGER POINTS 3/>: CPT | Performed by: PHYSICAL MEDICINE & REHABILITATION

## 2017-11-02 ENCOUNTER — OFFICE VISIT (OUTPATIENT)
Dept: PHYSICAL MEDICINE AND REHAB | Age: 37
End: 2017-11-02

## 2017-11-02 DIAGNOSIS — G57.93 NEUROPATHY INVOLVING BOTH LOWER EXTREMITIES: ICD-10-CM

## 2017-11-02 DIAGNOSIS — M51.36 DDD (DEGENERATIVE DISC DISEASE), LUMBAR: Primary | ICD-10-CM

## 2017-11-02 DIAGNOSIS — M54.16 LUMBAR RADICULOPATHY: ICD-10-CM

## 2017-11-02 DIAGNOSIS — M54.17 LUMBOSACRAL RADICULOPATHY AT L5: ICD-10-CM

## 2017-11-02 PROCEDURE — 95886 MUSC TEST DONE W/N TEST COMP: CPT | Performed by: PHYSICAL MEDICINE & REHABILITATION

## 2017-11-02 PROCEDURE — 95913 NRV CNDJ TEST 13/> STUDIES: CPT | Performed by: PHYSICAL MEDICINE & REHABILITATION

## 2017-11-02 NOTE — PROGRESS NOTES
Patient here for BLE EMG/NCV study for back pain radiating down left leg.   The EMG showed bilateral L5 radiculopathy right greater than left as well as generalized sensory neuropathy bilateral lower extremities

## 2017-11-03 ENCOUNTER — OFFICE VISIT (OUTPATIENT)
Dept: PHYSICAL MEDICINE AND REHAB | Age: 37
End: 2017-11-03

## 2017-11-03 ENCOUNTER — TELEPHONE (OUTPATIENT)
Dept: PRIMARY CARE CLINIC | Age: 37
End: 2017-11-03

## 2017-11-03 ENCOUNTER — HOSPITAL ENCOUNTER (OUTPATIENT)
Dept: GENERAL RADIOLOGY | Age: 37
Discharge: HOME | End: 2017-11-03
Payer: COMMERCIAL

## 2017-11-03 VITALS
WEIGHT: 207 LBS | DIASTOLIC BLOOD PRESSURE: 76 MMHG | HEIGHT: 68 IN | SYSTOLIC BLOOD PRESSURE: 106 MMHG | BODY MASS INDEX: 31.37 KG/M2

## 2017-11-03 DIAGNOSIS — M54.2 NECK PAIN: ICD-10-CM

## 2017-11-03 DIAGNOSIS — G47.00 INSOMNIA, UNSPECIFIED TYPE: ICD-10-CM

## 2017-11-03 DIAGNOSIS — M51.36 DDD (DEGENERATIVE DISC DISEASE), LUMBAR: ICD-10-CM

## 2017-11-03 DIAGNOSIS — G89.29 CHRONIC PAIN OF LEFT KNEE: Primary | ICD-10-CM

## 2017-11-03 DIAGNOSIS — M54.81 OCCIPITAL NEURALGIA OF RIGHT SIDE: ICD-10-CM

## 2017-11-03 DIAGNOSIS — R41.840 ATTENTION DEFICIT: ICD-10-CM

## 2017-11-03 DIAGNOSIS — M25.562 CHRONIC PAIN OF LEFT KNEE: Primary | ICD-10-CM

## 2017-11-03 DIAGNOSIS — M54.40 ACUTE RIGHT-SIDED LOW BACK PAIN WITH SCIATICA, SCIATICA LATERALITY UNSPECIFIED: ICD-10-CM

## 2017-11-03 DIAGNOSIS — G56.81 NEUROPATHY OF RIGHT SUPRASCAPULAR NERVE: ICD-10-CM

## 2017-11-03 DIAGNOSIS — M54.16 LUMBAR RADICULOPATHY: ICD-10-CM

## 2017-11-03 DIAGNOSIS — M54.12 C7 RADICULOPATHY: ICD-10-CM

## 2017-11-03 DIAGNOSIS — G54.0 TOS (THORACIC OUTLET SYNDROME): ICD-10-CM

## 2017-11-03 DIAGNOSIS — Z79.899 MEDICAL MARIJUANA USE: ICD-10-CM

## 2017-11-03 PROCEDURE — G8427 DOCREV CUR MEDS BY ELIG CLIN: HCPCS | Performed by: PHYSICAL MEDICINE & REHABILITATION

## 2017-11-03 PROCEDURE — 99215 OFFICE O/P EST HI 40 MIN: CPT | Performed by: PHYSICAL MEDICINE & REHABILITATION

## 2017-11-03 PROCEDURE — G8417 CALC BMI ABV UP PARAM F/U: HCPCS | Performed by: PHYSICAL MEDICINE & REHABILITATION

## 2017-11-03 PROCEDURE — 1036F TOBACCO NON-USER: CPT | Performed by: PHYSICAL MEDICINE & REHABILITATION

## 2017-11-03 PROCEDURE — 73030 X-RAY EXAM OF SHOULDER: CPT

## 2017-11-03 PROCEDURE — G8484 FLU IMMUNIZE NO ADMIN: HCPCS | Performed by: PHYSICAL MEDICINE & REHABILITATION

## 2017-11-03 PROCEDURE — 73010 X-RAY EXAM OF SHOULDER BLADE: CPT

## 2017-11-03 PROCEDURE — 72040 X-RAY EXAM NECK SPINE 2-3 VW: CPT

## 2017-11-03 RX ORDER — DEXTROAMPHETAMINE SACCHARATE, AMPHETAMINE ASPARTATE, DEXTROAMPHETAMINE SULFATE AND AMPHETAMINE SULFATE 7.5; 7.5; 7.5; 7.5 MG/1; MG/1; MG/1; MG/1
30 TABLET ORAL 2 TIMES DAILY
Qty: 60 TABLET | Refills: 0 | Status: SHIPPED | OUTPATIENT
Start: 2017-11-03 | End: 2018-01-03

## 2017-11-03 RX ORDER — CELECOXIB 200 MG/1
CAPSULE ORAL
Qty: 60 CAPSULE | Refills: 1 | Status: SHIPPED | OUTPATIENT
Start: 2017-11-03 | End: 2017-12-07

## 2017-11-03 RX ORDER — ZOLPIDEM TARTRATE 10 MG/1
10 TABLET ORAL NIGHTLY PRN
Qty: 30 TABLET | Refills: 0 | Status: SHIPPED | OUTPATIENT
Start: 2017-11-03 | End: 2018-02-14 | Stop reason: SDUPTHER

## 2017-11-03 ASSESSMENT — ENCOUNTER SYMPTOMS
VOMITING: 0
DIARRHEA: 0
BACK PAIN: 1
CONSTIPATION: 0
APNEA: 0
CHEST TIGHTNESS: 0
SHORTNESS OF BREATH: 0
ABDOMINAL PAIN: 0
WHEEZING: 0
EYES NEGATIVE: 1
NAUSEA: 0

## 2017-11-03 NOTE — PROGRESS NOTES
Subjective  Danica Christine, 44 y.o. male presents today with:       Back Pain left sciatic block 10/02/17 gave 20% reduction in pain lasting 2-3 days; TP injections 10/17/17 gave 40% reduction in pain lasting 2-3 days     Neck Pain     Leg Pain bilateral, right side worse    Results MRI, EMG, and XR. Tox screen +THC. Shoulder Pain right shoulder-xray pending    Discuss Medications Topamax, is tolerating well. His new main concern is the right suprascapular pain and B C7 numbness. The Suprascap block I gave him in Sept was the most helpful and he wants to know the cause of the nerve irritation and seek relief. Neck Pain    This is a chronic problem. The current episode started more than 1 year ago. The problem occurs constantly. The pain is associated with nothing. The pain is present in the occipital region. The quality of the pain is described as aching. The pain is at a severity of 8/10. The pain is severe. The symptoms are aggravated by bending. Stiffness is present in the morning. Associated symptoms include numbness, tingling and weakness. Pertinent negatives include no chest pain or headaches. He has tried chiropractic manipulation, heat, home exercises, acetaminophen, ice and NSAIDs for the symptoms. The treatment provided mild relief. Back Pain   This is a chronic problem. The current episode started more than 1 year ago. The problem occurs constantly. The problem is unchanged. The pain is present in the gluteal. The pain radiates to the left knee, left thigh and left foot. The pain is at a severity of 8/10. The pain is severe. The pain is worse during the day. The symptoms are aggravated by bending, sitting, twisting and standing. Associated symptoms include numbness, tingling and weakness. Pertinent negatives include no abdominal pain, chest pain or headaches. Risk factors include lack of exercise, obesity, poor posture and sedentary lifestyle.  He has tried analgesics, heat, chiropractic manipulation, home exercises and NSAIDs for the symptoms. The treatment provided mild relief. Past Medical History:   Diagnosis Date    Abdominal pain, epigastric 3/23/2017    ADHD (attention deficit hyperactivity disorder)     Anxiety state, unspecified     Bipolar disorder, unspecified     Carpal tunnel syndrome, bilateral     emg pos    Fibromyalgia     dr Peterson Cottrell Insomnia, unspecified      Past Surgical History:   Procedure Laterality Date    HERNIA REPAIR       Social History     Social History    Marital status:      Spouse name: Henrique Arias Number of children: 3    Years of education: 15     Occupational History    Security      Social History Main Topics    Smoking status: Former Smoker     Years: 10.00     Quit date: 2005    Smokeless tobacco: Never Used    Alcohol use Yes      Comment: occasional    Drug use: No    Sexual activity: Not Asked     Other Topics Concern    None     Social History Narrative    Patient lives in a split level home with his wife and 3 children. Youngest child was diagnoses with Autism Epilepsy  around [id number]. Does do a lot of lifting of the youngest son. ADL's without any assistance. Family History   Problem Relation Age of Onset    Cancer Father        No Known Allergies    Review of Systems   Constitutional: Negative for activity change, diaphoresis, fatigue and unexpected weight change. HENT: Negative. Eyes: Negative. Respiratory: Negative for apnea, chest tightness, shortness of breath and wheezing. Cardiovascular: Negative for chest pain, palpitations and leg swelling. Gastrointestinal: Negative for abdominal pain, constipation, diarrhea, nausea and vomiting. Endocrine: Negative. Genitourinary: Negative. Musculoskeletal: Positive for back pain, myalgias, neck pain and neck stiffness. Negative for arthralgias, gait problem and joint swelling. Skin: Positive for rash.    Neurological: Positive for tingling, weakness Right hip: Normal.        Left hip: Normal.        Right knee: Normal.        Left knee: Normal.        Right ankle: Normal. Achilles tendon normal.        Left ankle: Normal. Achilles tendon normal.        Cervical back: He exhibits decreased range of motion, tenderness and bony tenderness. Thoracic back: Normal.        Lumbar back: He exhibits decreased range of motion, tenderness, bony tenderness and pain. He exhibits no swelling, no edema, no deformity, no laceration and normal pulse. Back:         Right upper arm: Normal.        Left upper arm: Normal.        Right forearm: Normal.        Left forearm: Normal.        Right hand: Normal.        Left hand: Normal.        Right upper leg: Normal.        Left upper leg: Normal.        Right lower leg: Normal.        Left lower leg: Normal.        Right foot: Normal.        Left foot: Normal.   Tender areas are indicated by numbered spot         Lymphadenopathy:     He has no cervical adenopathy. He has no axillary adenopathy. Right: No inguinal adenopathy present. Left: No inguinal adenopathy present. Neurological: He is alert and oriented to person, place, and time. He has normal strength. He displays abnormal reflex. He displays no atrophy and no tremor. A sensory deficit is present. No cranial nerve deficit. He exhibits normal muscle tone. Gait abnormal. Coordination normal. He displays no Babinski's sign on the right side. He displays no Babinski's sign on the left side. Reflex Scores:       Tricep reflexes are 2+ on the right side and 2+ on the left side. Bicep reflexes are 2+ on the right side and 2+ on the left side. Brachioradialis reflexes are 2+ on the right side and 2+ on the left side. Patellar reflexes are 2+ on the right side and 2+ on the left side. Achilles reflexes are 1+ on the right side and 1+ on the left side. Skin: Skin is warm, dry and intact.  No abrasion, no bruising, no ecchymosis, no laceration, no petechiae and no rash noted. Rash is not macular, not pustular and not urticarial. He is not diaphoretic. No cyanosis or erythema. No pallor. Nails show no clubbing. Psychiatric: He has a normal mood and affect. His behavior is normal. Judgment and thought content normal. His mood appears not anxious. His affect is not angry, not blunt, not labile and not inappropriate. His speech is not rapid and/or pressured, not delayed, not tangential and not slurred. He is not agitated, not aggressive, not hyperactive, not slowed, not withdrawn, not actively hallucinating and not combative. Thought content is not paranoid and not delusional. Cognition and memory are normal. Cognition and memory are not impaired. He does not express impulsivity or inappropriate judgment. He does not exhibit a depressed mood. He expresses no homicidal and no suicidal ideation. He expresses no suicidal plans and no homicidal plans. He is communicative. He exhibits normal recent memory and normal remote memory. He is attentive. Vitals reviewed. Ortho Exam  Neurologic Exam     Mental Status   Oriented to person, place, and time. Speech: not slurred   Level of consciousness: alert  Knowledge: good. Able to name object. Able to read. Able to repeat. Able to write. Cranial Nerves     CN III, IV, VI   Pupils are equal, round, and reactive to light. Extraocular motions are normal.     Motor Exam     Strength   Strength 5/5 throughout.      Sensory Exam   Sensory deficit distribution on right: C7  Sensory deficit distribution on left: C7    Gait, Coordination, and Reflexes     Reflexes   Right brachioradialis: 2+  Left brachioradialis: 2+  Right biceps: 2+  Left biceps: 2+  Right triceps: 2+  Left triceps: 2+  Right patellar: 2+  Left patellar: 2+  Right achilles: 1+  Left achilles: 1+        After a thorough review and discussion of the previous medical records, patient comprehensive medical, surgical, and family and social history, Review of Systems, their OARRS, their Screener and Opioid Assessment for Patients with Pain (SOAPP®-R), recent diagnostics, and symptomatic results to previous treatment, it is my impression that the patients is suffering with progressive and severe:    1. Chronic pain of left knee  celecoxib (CELEBREX) 200 MG capsule   2. DDD (degenerative disc disease), lumbar  Ambulatory referral to Occupational Therapy    celecoxib (CELEBREX) 200 MG capsule   3. Acute right-sided low back pain with sciatica, sciatica laterality unspecified  Ambulatory referral to Occupational Therapy    celecoxib (CELEBREX) 200 MG capsule   4. Lumbar radiculopathy  Ambulatory referral to Occupational Therapy   5. Medical marijuana use     6. TOS (thoracic outlet syndrome)  Ambulatory referral to Occupational Therapy    celecoxib (CELEBREX) 200 MG capsule   7. Neck pain  XR SHOULDER RIGHT (MIN 2 VIEWS)    XR SCAPULA RIGHT (COMPLETE)    XR CERVICAL SPINE (2-3 VIEWS)    Ambulatory referral to Occupational Therapy    celecoxib (CELEBREX) 200 MG capsule   8. C7 radiculopathy  EMG   9.  Neuropathy of right suprascapular nerve  INJECT NERV BLCK,SUPRASCAP N.   10. Occipital neuralgia of right side  SC INJECT NERV BLCK,GREAT OCCIPTL       I am also concerned by lifestyle and mood issues including:    Past Medical History:   Diagnosis Date    Abdominal pain, epigastric 3/23/2017    ADHD (attention deficit hyperactivity disorder)     Anxiety state, unspecified     Bipolar disorder, unspecified     Carpal tunnel syndrome, bilateral     emg pos    Fibromyalgia     dr Avinash Li    Insomnia, unspecified            Given their medication, chronic pain and lifestyle and medications they are at risk for :    Falls, constipation, addiction  Loss of livelyhood due to severe pain, debility, weight gain and  vitamin D deficiency    The patient was educated regarding proper diet, fitness routine, and regulatory restrictions concerning pain medications. Previous notes, comprehensive past medical, surgical, family history, and diagnostics were reviewed. Patient education and councelling were provided regarding off label use,treatment options and medication and injection risks. Current and old OARRS ([place] Automated Prescription Reporting System) records reviewed, all refills reviewed since last visit,  Behavioral agreement/[facility] regulations   and Toxicology screen was reviewed with patient and is up to date. There are no current red flags. They are making good progress regarding pain relief, they are performing at a functional level regarding activities of daily living and psychological functioning, they're not having any adverse effects or side effects from the current medications, and I see no findings of aberrant drug taking her addiction related behaviors. Attestation: The Prescription Monitoring Report for this patient was reviewed today. (Dee Soria DO)  Documentation: No signs of potential drug abuse or diversion identified. (Dee Soria DO)       Patient is currently taking:       I have discontinued Mr. Pantoja's benzonatate, fluticasone, and methocarbamol. I am also having him start on celecoxib. Additionally, I am having him maintain his topiramate, amphetamine-dextroamphetamine, lidocaine, and zolpidem. I also recommend the following Medications:    Orders Placed This Encounter   Medications    celecoxib (CELEBREX) 200 MG capsule     Sig: Take one pill twice a day as needed  Generic equivalent acceptable, unless otherwise noted. Dispense:  60 capsule     Refill:  1        -which helps with pain and function. Otherwise, continue the current pain medications that I have prescibed.       Radiologic:   Old films reviewed    NO FRACTURE NOR SIGNIFICANT MALALIGNMENT WITH MULTILEVEL DEGENERATIVE CHANGES, SEE INDIVIDUAL LEVELS ABOVE.       POSTERIOR PROTRUSION L5-S1 DISC WITH MILD NARROWING OF NEURAL FORAMINA, LEFT GREATER THAN RIGHT. HYPERTROPHY OF THE L5-S1 FACET JOINTS.       POSTERIOR BULGING OF THE L1-L2 DISC NO CENTRAL CANAL OR NEURAL FORAMINAL STENOSIS.     ,     I discussed results with patients. see Follow up plans below  For any new studies. Care Everywhere Updates:  requested and reviewed. No new issues noted. Electrodiagnostic:  Previous studies reviewed      BLE sensory neuropathy,    B L5 radic     I discussed results with patient. See follow-up plans for new studies BUE emg eval ssuprascap and C7 radics.         Labs:  Previous labs reviewed     Lab Results   Component Value Date     03/23/2017    K 4.3 03/23/2017     03/23/2017    CO2 24 03/23/2017    BUN 16 03/23/2017    CREATININE 0.76 03/23/2017    CALCIUM 9.6 03/23/2017    LABALBU 4.61 09/29/2017    BILITOT 0.5 03/23/2017    ALKPHOS 81 03/23/2017    AST 30 03/23/2017    ALT 83 03/23/2017     Lab Results   Component Value Date    WBC 8.5 09/29/2017    RBC 5.14 09/29/2017    HGB 15.2 09/29/2017    HCT 45.4 09/29/2017    MCV 88.3 09/29/2017    MCH 29.6 09/29/2017    MCHC 33.5 09/29/2017    RDW 13.1 09/29/2017     09/29/2017       Lab Results   Component Value Date    LABAMPH POSITIVE 07/14/2016    BARBSCNU Neg 07/14/2016    LABBENZ Neg 07/14/2016    CANSU Neg 07/14/2016    COCAIMETSCRU Neg 07/14/2016    PHENCYCLIDINESCREENURINE Neg 07/14/2016    DSCOMMENT see below 07/14/2016       Lab Results   Component Value Date    CODEINE Not Detected 09/29/2017    MORPHINE Not Detected 09/29/2017    ACETYLMORPHI Not Detected 09/29/2017    OXYCODONE Not Detected 09/29/2017    NOROXYCODONE Not Detected 09/29/2017    NOROXYMU Not Detected 09/29/2017    [facility] Not Detected 09/29/2017    NORHYDU Not Detected 09/29/2017    HYDROMO Not Detected 09/29/2017    BUPREN Not Detected 09/29/2017    Deri Latrell Not Detected 09/29/2017    FENTA Not Detected 09/29/2017    NORFENT Not Detected 09/29/2017    MEPERIDINE Not and assess appropriateness for continued therapy. New Diagnostics  Orders Placed This Encounter   Procedures    XR SHOULDER RIGHT (MIN 2 VIEWS)    XR SCAPULA RIGHT (COMPLETE)    XR CERVICAL SPINE (2-3 VIEWS)    Ambulatory referral to Occupational Therapy    EMG    INJECT NERV [address] INJECT NERV BLCK,GREAT OCCIPTL   Patient will follow up of her see one month and I will consider getting an MRI of his neck at that time if appropriate.     Genie Cornelius, DO

## 2017-11-03 NOTE — TELEPHONE ENCOUNTER
From: Lore Watson  Sent: 11/2/2017 2:10 PM EDT  Subject: Medication Renewal Request    Lore Walter would like a refill of the following medications:  amphetamine-dextroamphetamine (ADDERALL, 30MG,) 30 MG tablet Micki Martinez MD]  zolpidem (AMBIEN) 10 MG tablet Micki Martinez MD]    Preferred pharmacy: [place] [id number], [address] [facility] [phone] - F [phone]    Comment:      Medication renewals requested in this message routed separately:  topiramate (TOPAMAX) 25 MG tablet [Archana Phipps, DO]

## 2017-11-14 ENCOUNTER — HOSPITAL ENCOUNTER (OUTPATIENT)
Dept: INTERVENTIONAL RADIOLOGY/VASCULAR | Age: 37
Discharge: HOME | End: 2017-11-14
Payer: COMMERCIAL

## 2017-11-14 VITALS
BODY MASS INDEX: 31.37 KG/M2 | DIASTOLIC BLOOD PRESSURE: 81 MMHG | HEART RATE: 74 BPM | HEIGHT: 68 IN | WEIGHT: 207 LBS | RESPIRATION RATE: 16 BRPM | OXYGEN SATURATION: 100 % | SYSTOLIC BLOOD PRESSURE: 124 MMHG

## 2017-11-14 DIAGNOSIS — M53.3 SACRO-ILIAC PAIN: ICD-10-CM

## 2017-11-14 PROCEDURE — 27096 INJECT SACROILIAC JOINT: CPT | Performed by: PHYSICAL MEDICINE & REHABILITATION

## 2017-11-14 PROCEDURE — 6360000002 HC RX W HCPCS: Performed by: PHYSICAL MEDICINE & REHABILITATION

## 2017-11-14 PROCEDURE — G0260 INJ FOR SACROILIAC JT ANESTH: HCPCS | Performed by: PHYSICAL MEDICINE & REHABILITATION

## 2017-11-14 PROCEDURE — 2500000003 HC RX 250 WO HCPCS: Performed by: PHYSICAL MEDICINE & REHABILITATION

## 2017-11-14 RX ORDER — METHYLPREDNISOLONE ACETATE 40 MG/ML
40 INJECTION, SUSPENSION INTRA-ARTICULAR; INTRALESIONAL; INTRAMUSCULAR; SOFT TISSUE ONCE
Status: COMPLETED | OUTPATIENT
Start: 2017-11-14 | End: 2017-11-14

## 2017-11-14 RX ORDER — LIDOCAINE HYDROCHLORIDE 5 MG/ML
50 INJECTION, SOLUTION INFILTRATION; INTRAVENOUS ONCE
Status: COMPLETED | OUTPATIENT
Start: 2017-11-14 | End: 2017-11-14

## 2017-11-14 RX ADMIN — LIDOCAINE HYDROCHLORIDE 6 ML: 5 INJECTION, SOLUTION INFILTRATION; INTRAVENOUS at 12:13

## 2017-11-14 RX ADMIN — METHYLPREDNISOLONE ACETATE 40 MG: 40 INJECTION, SUSPENSION INTRA-ARTICULAR; INTRALESIONAL; INTRAMUSCULAR; SOFT TISSUE at 12:14

## 2017-11-14 ASSESSMENT — PAIN - FUNCTIONAL ASSESSMENT
PAIN_FUNCTIONAL_ASSESSMENT: 0-10
PAIN_FUNCTIONAL_ASSESSMENT: 0-10

## 2017-11-14 NOTE — SEDATION DOCUMENTATION
Patient was brought to Special Procedures suite via wheelchair. Patient onto procedure table prone with minimal assistance. Placed on vitals monitor. Caudal site prepped with Iodine and Hibiclense, draped and numbed with lidocaine. Needle placement verifed with fluoro guidance. All prep and medications given by Dr Jyothi Moncada. Patient tolerated well. Patient will return to the CT holding room.

## 2017-11-14 NOTE — PROGRESS NOTES
Arrived to CT holding via w/c. Denies any pain. Denies any numbness to legs. No acute distress noted. D/C instruct given. States understanding and denies any questions. Up getting dressed without difficulty. Gait steady. Will escort off floor when ready.

## 2017-11-14 NOTE — PROGRESS NOTES
Pt a&o x4. Arrived to CT holding with stevens steady gait. States back pain 7/10. Consent signed. Denies any questions/concerns.

## 2017-11-14 NOTE — PROCEDURES
OPERATIVE REPORT      DATE OF PROCEDURE: 11/14/2017    PREOPERATIVE DIAGNOSIS:   Sacroiliac pain. POSTOPERATIVE DIAGNOSIS:   Same. OPERATIVE PROCEDURE:  Sacroiliac injection with fluoroscopy. PROCEDURE:  The patient taken to the special procedures department and placed in a prone position. The left sacroiliac injection was performed under sterile conditions with C-arm assist.  The left sacroiliac space was identified with C-arm and needle was placed in the sacroiliac joint without any problems and we were able to get down to the joint, inject 20mg of Depo-Medrol with 3ml of 0.5% Xylocaine under sterile conditions. A small amount <1 cc of free air was used to define the joint space. The patient tolerated the procedure well and was taken to the post-anesthesia care unit in stable condition. The patient was instructed to call our office the following business day for follow-up instructions and to notify us immediately if they were having any difficulties after the injection.           Jackeline Craven D.O.

## 2017-11-26 ENCOUNTER — OFFICE VISIT (OUTPATIENT)
Dept: PHYSICAL MEDICINE AND REHAB | Age: 37
End: 2017-11-26

## 2017-11-26 DIAGNOSIS — G56.81 NEUROPATHY OF RIGHT SUPRASCAPULAR NERVE: ICD-10-CM

## 2017-11-26 DIAGNOSIS — G56.00 CARPAL TUNNEL SYNDROME, UNSPECIFIED LATERALITY: Primary | ICD-10-CM

## 2017-11-26 DIAGNOSIS — G54.0 TOS (THORACIC OUTLET SYNDROME): ICD-10-CM

## 2017-11-26 PROCEDURE — 95886 MUSC TEST DONE W/N TEST COMP: CPT | Performed by: PHYSICAL MEDICINE & REHABILITATION

## 2017-11-26 PROCEDURE — 95912 NRV CNDJ TEST 11-12 STUDIES: CPT | Performed by: PHYSICAL MEDICINE & REHABILITATION

## 2017-11-28 ENCOUNTER — PROCEDURE VISIT (OUTPATIENT)
Dept: PHYSICAL MEDICINE AND REHAB | Age: 37
End: 2017-11-28

## 2017-11-28 DIAGNOSIS — M54.81 OCCIPITAL NEURALGIA OF RIGHT SIDE: Primary | ICD-10-CM

## 2017-11-28 PROCEDURE — 64405 NJX AA&/STRD GR OCPL NRV: CPT | Performed by: PHYSICAL MEDICINE & REHABILITATION

## 2017-12-05 ENCOUNTER — OFFICE VISIT (OUTPATIENT)
Dept: PRIMARY CARE CLINIC | Age: 37
End: 2017-12-05

## 2017-12-05 VITALS
HEART RATE: 88 BPM | TEMPERATURE: 97.8 F | RESPIRATION RATE: 14 BRPM | SYSTOLIC BLOOD PRESSURE: 124 MMHG | BODY MASS INDEX: 31.52 KG/M2 | DIASTOLIC BLOOD PRESSURE: 70 MMHG | HEIGHT: 68 IN | WEIGHT: 208 LBS

## 2017-12-05 DIAGNOSIS — R52 WHOLE BODY PAIN: Primary | ICD-10-CM

## 2017-12-05 DIAGNOSIS — F32.A DEPRESSION, UNSPECIFIED DEPRESSION TYPE: ICD-10-CM

## 2017-12-05 PROCEDURE — 1036F TOBACCO NON-USER: CPT | Performed by: INTERNAL MEDICINE

## 2017-12-05 PROCEDURE — G8417 CALC BMI ABV UP PARAM F/U: HCPCS | Performed by: INTERNAL MEDICINE

## 2017-12-05 PROCEDURE — G8484 FLU IMMUNIZE NO ADMIN: HCPCS | Performed by: INTERNAL MEDICINE

## 2017-12-05 PROCEDURE — 99213 OFFICE O/P EST LOW 20 MIN: CPT | Performed by: INTERNAL MEDICINE

## 2017-12-05 PROCEDURE — G8427 DOCREV CUR MEDS BY ELIG CLIN: HCPCS | Performed by: INTERNAL MEDICINE

## 2017-12-05 RX ORDER — DULOXETIN HYDROCHLORIDE 30 MG/1
30 CAPSULE, DELAYED RELEASE ORAL DAILY
Qty: 30 CAPSULE | Refills: 0 | Status: SHIPPED | OUTPATIENT
Start: 2017-12-05 | End: 2018-01-03

## 2017-12-05 RX ORDER — DULOXETIN HYDROCHLORIDE 60 MG/1
60 CAPSULE, DELAYED RELEASE ORAL DAILY
Qty: 30 CAPSULE | Refills: 5 | Status: SHIPPED | OUTPATIENT
Start: 2017-12-05 | End: 2018-01-03

## 2017-12-05 NOTE — PROGRESS NOTES
Rad Jordan 44 y.o. male presents today with   Chief Complaint   Patient presents with    Anxiety     Pt. is here for a f/u on anxiety. Pt. wants to get back on a medication for it. Pt. denies any symptoms today. Mental Health Problem   The primary symptoms include dysphoric mood and somatic symptoms. The current episode started more than 1 month ago. This is a recurrent problem. Somatic symptoms include myalgias. Somatic symptoms do not include fatigue. The onset of the illness is precipitated by emotional stress and a stressful event. The degree of incapacity that he is experiencing as a consequence of his illness is mild. Additional symptoms of the illness include anhedonia, insomnia, agitation and distractible. Additional symptoms of the illness do not include fatigue.        Past Medical History:   Diagnosis Date    Abdominal pain, epigastric 3/23/2017    ADHD (attention deficit hyperactivity disorder)     Anxiety state, unspecified     Bipolar disorder, unspecified     Carpal tunnel syndrome, bilateral     emg pos    Fibromyalgia     dr Ambreen Mondragon    Insomnia, unspecified      Patient Active Problem List    Diagnosis Date Noted    Back pain - 09/28/17 OARRS PM&R, 11/29/17 OARRS PM&R, 09/29/17 Med Contract PM&R  06/06/2013     Priority: High    Carpal tunnel syndrome 11/26/2017    C7 radiculopathy 11/03/2017    Neuropathy of right suprascapular nerve 11/03/2017    Occipital neuralgia of right side 11/03/2017    Neuropathy involving both lower extremities 11/02/2017    Lumbosacral radiculopathy at L5 11/02/2017    Lumbar radiculopathy 10/12/2017    Chronic pain of left knee 09/29/2017    Neck pain 09/29/2017    DDD (degenerative disc disease), lumbar 09/29/2017    TOS (thoracic outlet syndrome) 09/29/2017    Medical marijuana use 09/29/2017    Abdominal pain, epigastric 03/23/2017    Attention deficit 03/26/2014    Anxiety state 06/06/2013    Attention deficit disorder well-developed and well-nourished. HENT:   Head: Normocephalic and atraumatic. Eyes: Conjunctivae and EOM are normal. Pupils are equal, round, and reactive to light. Neck: Normal range of motion. Cardiovascular: Normal rate and regular rhythm. Pulmonary/Chest: Effort normal. No respiratory distress. He has no wheezes. Abdominal: Soft. He exhibits no distension. Musculoskeletal: Normal range of motion. Neurological: He is alert. Skin: Skin is dry. Assessment/Plan  Durand was seen today for anxiety. Diagnoses and all orders for this visit:    Whole body pain  -     Amb External Referral To Neurology  -     DULoxetine (CYMBALTA) 60 MG extended release capsule; Take 1 capsule by mouth daily    Depression, unspecified depression type  -     Referral To Unknown External Psychiatry  -     DULoxetine (CYMBALTA) 30 MG extended release capsule; Take 1 capsule by mouth daily  -     DULoxetine (CYMBALTA) 60 MG extended release capsule; Take 1 capsule by mouth daily        No Follow-up on file.     Juan Iglesias MD

## 2017-12-07 ENCOUNTER — OFFICE VISIT (OUTPATIENT)
Dept: PHYSICAL MEDICINE AND REHAB | Age: 37
End: 2017-12-07

## 2017-12-07 VITALS
BODY MASS INDEX: 30.31 KG/M2 | SYSTOLIC BLOOD PRESSURE: 100 MMHG | DIASTOLIC BLOOD PRESSURE: 60 MMHG | WEIGHT: 200 LBS | HEIGHT: 68 IN

## 2017-12-07 DIAGNOSIS — M54.17 LUMBOSACRAL RADICULOPATHY AT L5: ICD-10-CM

## 2017-12-07 DIAGNOSIS — G54.0 TOS (THORACIC OUTLET SYNDROME): ICD-10-CM

## 2017-12-07 DIAGNOSIS — M54.2 NECK PAIN: Primary | ICD-10-CM

## 2017-12-07 DIAGNOSIS — M51.36 DDD (DEGENERATIVE DISC DISEASE), LUMBAR: ICD-10-CM

## 2017-12-07 DIAGNOSIS — Z79.899 MEDICAL MARIJUANA USE: ICD-10-CM

## 2017-12-07 DIAGNOSIS — M54.40 LOW BACK PAIN WITH SCIATICA, SCIATICA LATERALITY UNSPECIFIED, UNSPECIFIED BACK PAIN LATERALITY, UNSPECIFIED CHRONICITY: ICD-10-CM

## 2017-12-07 DIAGNOSIS — M54.81 OCCIPITAL NEURALGIA OF RIGHT SIDE: ICD-10-CM

## 2017-12-07 DIAGNOSIS — G56.00 CARPAL TUNNEL SYNDROME, UNSPECIFIED LATERALITY: ICD-10-CM

## 2017-12-07 DIAGNOSIS — M54.12 C7 RADICULOPATHY: ICD-10-CM

## 2017-12-07 DIAGNOSIS — M54.16 LUMBAR RADICULOPATHY: ICD-10-CM

## 2017-12-07 DIAGNOSIS — F41.1 ANXIETY STATE: ICD-10-CM

## 2017-12-07 PROCEDURE — G8484 FLU IMMUNIZE NO ADMIN: HCPCS | Performed by: PHYSICAL MEDICINE & REHABILITATION

## 2017-12-07 PROCEDURE — G8417 CALC BMI ABV UP PARAM F/U: HCPCS | Performed by: PHYSICAL MEDICINE & REHABILITATION

## 2017-12-07 PROCEDURE — 1036F TOBACCO NON-USER: CPT | Performed by: PHYSICAL MEDICINE & REHABILITATION

## 2017-12-07 PROCEDURE — 99215 OFFICE O/P EST HI 40 MIN: CPT | Performed by: PHYSICAL MEDICINE & REHABILITATION

## 2017-12-07 PROCEDURE — G8427 DOCREV CUR MEDS BY ELIG CLIN: HCPCS | Performed by: PHYSICAL MEDICINE & REHABILITATION

## 2017-12-07 RX ORDER — AZITHROMYCIN 250 MG/1
TABLET, FILM COATED ORAL
Refills: 0 | COMMUNITY
Start: 2017-12-05 | End: 2018-01-03 | Stop reason: ALTCHOICE

## 2017-12-07 RX ORDER — NAPROXEN 500 MG/1
500 TABLET ORAL 2 TIMES DAILY WITH MEALS
Qty: 60 TABLET | Refills: 3 | Status: SHIPPED | OUTPATIENT
Start: 2017-12-07 | End: 2018-01-03 | Stop reason: ALTCHOICE

## 2017-12-07 RX ORDER — METHYLPREDNISOLONE 4 MG/1
TABLET ORAL
Refills: 0 | COMMUNITY
Start: 2017-12-05 | End: 2018-01-03 | Stop reason: ALTCHOICE

## 2017-12-07 ASSESSMENT — ENCOUNTER SYMPTOMS
ABDOMINAL PAIN: 0
WHEEZING: 0
DIARRHEA: 0
SHORTNESS OF BREATH: 0
VOMITING: 0
CONSTIPATION: 0
APNEA: 0
BACK PAIN: 1
CHEST TIGHTNESS: 0
EYES NEGATIVE: 1
NAUSEA: 0

## 2017-12-07 NOTE — PROGRESS NOTES
Subjective  Alicia Gorman, 44 y.o. male presents today with:       Back Pain      Neck Pain would like to try OT    Leg Pain bilateral    Shoulder Pain right shoulder    Results EMG 11/26/17- Bilateral CTS, mild; Xr's WNL    Numbness left side of body       His new main concern is numbness on left side of body. EMG reveals bilateral carpal tunnel syndrome and cervical spine films are unremarkable I will get an MRI of his neck. He is off work because of neck pain. Neck Pain    This is a chronic problem. The current episode started more than 1 year ago. The problem occurs constantly. The pain is associated with nothing. The pain is present in the occipital region. The quality of the pain is described as aching. The pain is at a severity of 8/10. The pain is severe. The symptoms are aggravated by bending. Stiffness is present in the morning. Associated symptoms include numbness, tingling and weakness. Pertinent negatives include no chest pain or headaches. He has tried chiropractic manipulation, heat, home exercises, acetaminophen, ice and NSAIDs for the symptoms. The treatment provided mild relief. Back Pain   This is a chronic problem. The current episode started more than 1 year ago. The problem occurs constantly. The problem is unchanged. The pain is present in the gluteal. The pain radiates to the left knee, left thigh and left foot. The pain is at a severity of 8/10. The pain is severe. The pain is worse during the day. The symptoms are aggravated by bending, sitting, twisting and standing. Associated symptoms include numbness, tingling and weakness. Pertinent negatives include no abdominal pain, chest pain or headaches. Risk factors include lack of exercise, obesity, poor posture and sedentary lifestyle. He has tried analgesics, heat, chiropractic manipulation, home exercises and NSAIDs for the symptoms. The treatment provided mild relief.        Past Medical History:   Diagnosis Date    Abdominal normal.        Left ankle: Normal. Achilles tendon normal.        Cervical back: He exhibits decreased range of motion, tenderness and bony tenderness. Thoracic back: Normal.        Lumbar back: He exhibits decreased range of motion, tenderness, bony tenderness and pain. He exhibits no swelling, no edema, no deformity, no laceration and normal pulse. Back:         Right upper arm: Normal.        Left upper arm: Normal.        Right forearm: Normal.        Left forearm: Normal.        Right hand: Normal.        Left hand: Normal.        Right upper leg: Normal.        Left upper leg: Normal.        Right lower leg: Normal.        Left lower leg: Normal.        Legs:       Right foot: Normal.        Left foot: Normal.   Tender areas are indicated by numbered spot         Lymphadenopathy:     He has no cervical adenopathy. He has no axillary adenopathy. Right: No inguinal adenopathy present. Left: No inguinal adenopathy present. Neurological: He is alert and oriented to person, place, and time. He displays abnormal reflex. He displays no atrophy and no tremor. A sensory deficit is present. No cranial nerve deficit. He exhibits normal muscle tone. Gait abnormal. Coordination normal. He displays no Babinski's sign on the right side. He displays no Babinski's sign on the left side. Reflex Scores:       Tricep reflexes are 1+ on the right side and 0 on the left side. Bicep reflexes are 1+ on the right side and 1+ on the left side. Brachioradialis reflexes are 1+ on the right side and 1+ on the left side. Patellar reflexes are 2+ on the right side and 2+ on the left side. Achilles reflexes are 1+ on the right side and 1+ on the left side. Skin: Skin is warm, dry and intact. No abrasion, no bruising, no ecchymosis, no laceration, no petechiae and no rash noted. Rash is not macular, not pustular and not urticarial. He is not diaphoretic. No cyanosis or erythema.  No Occupational Therapy- [place]    naproxen (NAPROSYN) 500 MG tablet       I am also concerned by lifestyle and mood issues including:    Past Medical History:   Diagnosis Date    Abdominal pain, epigastric 3/23/2017    ADHD (attention deficit hyperactivity disorder)     Anxiety state, unspecified     Bipolar disorder, unspecified     Carpal tunnel syndrome, bilateral     emg pos    Fibromyalgia     dr Daly Hinkle    Insomnia, unspecified            Given their medication, chronic pain and lifestyle and medications they are at risk for :    Falls, constipation, addiction  Loss of livelyhood due to severe pain, debility, weight gain and  vitamin D deficiency    The patient was educated regarding proper diet, fitness routine, and regulatory restrictions concerning pain medications. Previous notes, comprehensive past medical, surgical, family history, and diagnostics were reviewed. Patient education and councelling were provided regarding off label use,treatment options and medication and injection risks. Current and old OARRS ([place] Automated Prescription Reporting System) records reviewed, all refills reviewed since last visit,  Behavioral agreement/J[facility] regulations   and Toxicology screen was reviewed with patient and is up to date. There are no current red flags. They are making good progress regarding pain relief, they are performing at a functional level regarding activities of daily living and psychological functioning, they're not having any adverse effects or side effects from the current medications, and I see no findings of aberrant drug taking her addiction related behaviors. Attestation: The Prescription Monitoring Report for this patient was reviewed today. (Juliocesar Atwood DO)  Documentation: Obtaining appropriate analgesic effect of treatment., No signs of potential drug abuse or diversion identified.  (Juliocesar Atwood DO)       Patient is currently taking:       I have

## 2017-12-08 ENCOUNTER — TELEPHONE (OUTPATIENT)
Dept: PRIMARY CARE CLINIC | Age: 37
End: 2017-12-08

## 2017-12-10 ASSESSMENT — ENCOUNTER SYMPTOMS
NAUSEA: 0
CHOKING: 0
TROUBLE SWALLOWING: 0
SHORTNESS OF BREATH: 0
VOMITING: 0
VOICE CHANGE: 0
PHOTOPHOBIA: 0

## 2017-12-12 ENCOUNTER — PROCEDURE VISIT (OUTPATIENT)
Dept: PHYSICAL MEDICINE AND REHAB | Age: 37
End: 2017-12-12

## 2017-12-12 DIAGNOSIS — Z79.899 MEDICAL MARIJUANA USE: ICD-10-CM

## 2017-12-12 DIAGNOSIS — M79.10 MYALGIA: Primary | ICD-10-CM

## 2017-12-12 PROCEDURE — 20553 NJX 1/MLT TRIGGER POINTS 3/>: CPT | Performed by: PHYSICAL MEDICINE & REHABILITATION

## 2017-12-13 ENCOUNTER — HOSPITAL ENCOUNTER (OUTPATIENT)
Dept: OCCUPATIONAL THERAPY | Age: 37
Setting detail: THERAPIES SERIES
Discharge: HOME | End: 2017-12-13
Payer: COMMERCIAL

## 2017-12-13 PROCEDURE — 97140 MANUAL THERAPY 1/> REGIONS: CPT

## 2017-12-13 PROCEDURE — 97166 OT EVAL MOD COMPLEX 45 MIN: CPT

## 2017-12-13 NOTE — PROGRESS NOTES
Occupational Therapy                                                                                 [facility]                                                                          CHRONIC PAIN EVALUATION                                                                                                                   DATE: 2017                                                                                                                                  Referring Practitioner: Dr. Justin Myers   Diagnosis: LBP, sciatica, neck pain, DDD, TOS, radiculopathy      PATIENT NAME; Alicia Gorman                                                  MRN: [id number]    ACCOUNT#: [[id number]]                                                     :1980                     (44 y.o.)                          MEDICATIONS: Adderall 30 mg x 2, Ambien 10 mg, lidocaine 5% patch, Topiramate 25 mg    GENDER:  male                PRECAUTIONS:  none                                                             HAND DOMINANCE: right handed     PRIOR LEVEL OF FUNCTION:   IND                                    DATE OF SX OR INJURY: Pt reported hernia sx in  or . PREVIOUS/CURRENT TREATMENT FOR SAME PROBLEM:    []   YES [x]     NO    CHIEF COMPLAINT: Pain in neck     PERTINENT MEDICAL HISTORY:   Anxiety state, unspecified [F41.1]     Insomnia, unspecified [G47.00]     Bipolar disorder, unspecified [F31.9]     Fibromyalgia [M79.7]     Carpal tunnel syndrome, bilateral [G56.03]     Abdominal pain, epigastric [R10.13]     ADHD (attention deficit hyperactivity disorder) [F90.9]       SOCIAL SUPPORT: Wife       ADL/HOBBIES/ROUTINES: Pt IND with ADL's. Pt exercised often at gym and likes to swim. Pt enjoys spending time with his wife and 3 children. Employed as .        FALLS: see falls risk assessment form      PAIN SCALE:   7/10                                         LOCATION: neck

## 2017-12-14 ENCOUNTER — TELEPHONE (OUTPATIENT)
Dept: PRIMARY CARE CLINIC | Age: 37
End: 2017-12-14

## 2017-12-14 LAB
6-ACETYLMORPHINE: NOT DETECTED
7-AMINOCLONAZEPAM: NOT DETECTED
ALPHA-OH-ALPRAZOLAM: NOT DETECTED
ALPRAZOLAM: NOT DETECTED
AMPHETAMINE: PRESENT
BARBITURATES: NOT DETECTED
BENZOYLECGONINE: NOT DETECTED
BUPRENORPHINE: NOT DETECTED
CARISOPRODOL: NOT DETECTED
CLONAZEPAM: NOT DETECTED
CODEINE: NOT DETECTED
CREATININE URINE: 196.1 MG/DL (ref 20–400)
DIAZEPAM: NOT DETECTED
EER PAIN MGT DRUG PANEL, HIGH RES/EMIT U: NORMAL
ETHYL GLUCURONIDE: NOT DETECTED
FENTANYL: NOT DETECTED
HYDROCODONE: PRESENT
HYDROMORPHONE: NOT DETECTED
LORAZEPAM: NOT DETECTED
MARIJUANA METABOLITE: PRESENT
MDA: NOT DETECTED
MDEA: NOT DETECTED
MDMA URINE: NOT DETECTED
MEPERIDINE: NOT DETECTED
METHADONE: NOT DETECTED
METHAMPHETAMINE: NOT DETECTED
METHYLPHENIDATE: NOT DETECTED
MIDAZOLAM: NOT DETECTED
MORPHINE: NOT DETECTED
NORBUPRENORPHINE, FREE: NOT DETECTED
NORDIAZEPAM: NOT DETECTED
NORFENTANYL: NOT DETECTED
NORHYDROCODONE, URINE: PRESENT
NOROXYCODONE: NOT DETECTED
NOROXYMORPHONE, URINE: NOT DETECTED
OXAZEPAM: NOT DETECTED
OXYCODONE: NOT DETECTED
OXYMORPHONE: NOT DETECTED
PAIN MANAGEMENT DRUG PANEL: NORMAL
PCP: NOT DETECTED
PHENTERMINE: NOT DETECTED
PROPOXYPHENE: NOT DETECTED
TAPENTADOL, URINE: NOT DETECTED
TAPENTADOL-O-SULFATE, URINE: NOT DETECTED
TEMAZEPAM: NOT DETECTED
TRAMADOL: NOT DETECTED
ZOLPIDEM: NOT DETECTED

## 2017-12-19 ENCOUNTER — TELEPHONE (OUTPATIENT)
Dept: PRIMARY CARE CLINIC | Age: 37
End: 2017-12-19

## 2017-12-19 NOTE — TELEPHONE ENCOUNTER
He said fibromyalgia, disc degeneration of lumbar region, radiculopathy of cervical region and kidney stones- he had surgery yesterday at [facility].

## 2017-12-20 ENCOUNTER — HOSPITAL ENCOUNTER (OUTPATIENT)
Dept: MRI IMAGING | Age: 37
Discharge: HOME | End: 2017-12-20
Payer: COMMERCIAL

## 2017-12-20 DIAGNOSIS — M54.16 LUMBAR RADICULOPATHY: ICD-10-CM

## 2017-12-20 DIAGNOSIS — M54.2 NECK PAIN: ICD-10-CM

## 2017-12-20 DIAGNOSIS — M54.12 C7 RADICULOPATHY: ICD-10-CM

## 2017-12-20 DIAGNOSIS — G54.0 TOS (THORACIC OUTLET SYNDROME): ICD-10-CM

## 2017-12-20 PROCEDURE — 72141 MRI NECK SPINE W/O DYE: CPT

## 2017-12-27 ENCOUNTER — HOSPITAL ENCOUNTER (OUTPATIENT)
Dept: OCCUPATIONAL THERAPY | Age: 37
Setting detail: THERAPIES SERIES
Discharge: HOME | End: 2017-12-27
Payer: COMMERCIAL

## 2017-12-27 PROCEDURE — 97140 MANUAL THERAPY 1/> REGIONS: CPT

## 2017-12-27 NOTE — PROGRESS NOTES
Occupational Therapy  Daily Treatment Note  Date: 2017  Patient Name: Lore Watson  :  1980  MRN: [id number]       Subjective Patient arrived one hour early for appointment. Unable to see the patient until his appointment time. General  Referring Practitioner: Dr. Fahad Foster   Diagnosis: LBP, sciatica, neck pain, DDD, TOS, radiculopathy   OT Visit Information  Total # of Visits Approved: 30    Pain 3/10  Treatment Activities: Provided  infrared heat treatment to the thoracic back painful areas to the paraspinals per protocol. Following heat treatment provided direct treatment of MFR techniques. MFR Techniques:           Cervical Area:                                 [x]   Occipital Condyle                             []     Cervical in flexion                                                                                                        [x]      Lateral flexion                               [x]  Yoke Release                                   []    Cervical in  extension                                     [x]     Cross Hands                                  [x]    Soft tissue mobility             Hand/Wrist Area:                                []       Forearm Flexors               []        Forearm Extensors                                []       Biceps                               []        Soft Tissue Mobility                                [x]       Arm Pulls                           []       Transverse Plane Releases    Thoracic Area:                                    [x]       Respiratory Diaph. [x]       Paraspinals                                [x]       Vertical release                 [x]       Lateral thoracic                                [x]       Horizontal release             [x]       Posterior thor. soft tiss.mob.                                []       Unilateral Pectoral            []        Christiano. Horiz.  Pecs []       Vertical Pecs                     [x]       Arm Pulls both                                [x]       Thoracic Inlet transv. [x]        Soft Tissue mobility                                            plane         Assessment Patient tolerated treatment fair as patient reported LBP in supine for treatment. Provided a bolster under the patients knees for supine treatment and the pain went away. Patient reports decreased pain in the thoracic back at the end of the treatment. Post Treatment Pain Screening  Pain at present: 1  Scale Used: Numeric Score  Intervention List: Patient able to continue with treatment         Plan Continue with the current POC. Goals  Long term goals 1 & 2 addressed. Long term goal 1: Pt will report pain during cervical rotation 2/10 or less to increase participation in leisure and work related activities. Long term goal 2: Pt will report decrease in frequency of left side numbness and tingling while sitting for 20 minutes or more. Long term goal 3: Pt will be IND with all recommended HEP.      Therapy Time   Individual Concurrent Group Co-treatment   Time In  9:00 am         Time Out  10:00 am         Minutes  60                 BROOK Ryan/L  Electronically signed by PANCHO Ryan on 12/27/17 at 11:14 AM

## 2017-12-28 ENCOUNTER — HOSPITAL ENCOUNTER (OUTPATIENT)
Dept: OCCUPATIONAL THERAPY | Age: 37
Setting detail: THERAPIES SERIES
Discharge: HOME | End: 2017-12-28
Payer: COMMERCIAL

## 2017-12-28 PROCEDURE — 97140 MANUAL THERAPY 1/> REGIONS: CPT

## 2017-12-28 PROCEDURE — 97110 THERAPEUTIC EXERCISES: CPT

## 2017-12-28 ASSESSMENT — PAIN DESCRIPTION - PAIN TYPE: TYPE: CHRONIC PAIN

## 2017-12-28 ASSESSMENT — PAIN DESCRIPTION - LOCATION: LOCATION: NECK;BACK

## 2017-12-28 ASSESSMENT — PAIN DESCRIPTION - ORIENTATION: ORIENTATION: POSTERIOR;LOWER

## 2017-12-28 ASSESSMENT — PAIN SCALES - GENERAL: PAINLEVEL_OUTOF10: 5

## 2017-12-28 NOTE — PROGRESS NOTES
Occupational Therapy  Daily Treatment Note  Date: 2017  Patient Name: Joo Leyva  :  1980  MRN: [id number]       Subjective Treatment started on time. Referring Practitioner: Dr. oJcelyne Gibbons   Diagnosis: LBP, sciatica, neck pain, DDD, TOS, radiculopathy     Total # of Visits Approved: 30    Patient Currently in Pain: Yes  Pain Assessment: 0-10  Pain Level: 5  Pain Type: Chronic pain  Pain Location: Neck;Back  Pain Orientation: Posterior; Lower     Treatment Activities:  Provided infrared heat treatment to cervical paraspinals followed by direct treatment of MFR techniques. MFR Techniques:           Cervical Area:                                 [x]   Occipital Condyle                             []     Cervical in flexion                                                                                                        []      Lateral flexion                               [x]  Yoke Release                                   []    Cervical in  extension                                     []     Cross Hands                                  []    Soft tissue mobility             Hand/Wrist Area:                                []       Forearm Flexors               []        Forearm Extensors                                []       Biceps                               []        Soft Tissue Mobility                                [x]       Arm Pulls                           []       Transverse Plane Releases    Thoracic Area:                                    []       Respiratory Diaph. [x]       Paraspinals                                [x]       Vertical release                 []       Lateral thoracic                                [x]       Horizontal release             [x]       Posterior thor. soft tiss.mob.                                []       Unilateral Pectoral            []        Christiano. Horiz.  Pecs                                [] Vertical Pecs                     []       Arm Pull                                [x]       Thoracic Inlet transv.        []        Soft Tissue mobility                                            plane    Lumbar/Pelvic/Sacral Area:                                [x]      Lumbo/sacral decompression        []  Pelvic floor transv.plane                                []      Psoas release                                []  Scar releases                                []      Lateral Obliques                             []   Anterior pelvic tilts                                 []      Posterior Pelvic Tilts                      []  Lumbosacral junction                                 [x]      Dural tube release                                 Decompression                                 []      Piriformis Release                         []   Soft tissue mobility    Lower Extremities:                                []      Suprapatellar & Quadriceps          []  Hamstrings                                []      Upper Quads soft tissue mob. []  Leg pull                                [x]       Bilateral Leg pull                           []  Soft tissue mobility                                                                                                []  Transverse Planes    HEP supine knees to chest  HEP supine on vertically placed pillow for anterior chest stretch and cervical posture  HEP supine on tennis ball on cervical paraspinals on floor or against the wall         Assessment Patient tolerated treatment well and reports a reduction in pain at the end of the treatment. Post Treatment Pain Screening  Pain at present: 3  Scale Used: Numeric Score  Intervention List: Patient able to continue with treatment         Plan Continue with current POC. Goals  Long term goals 1-3 addressed.   Long term goal 1: Pt will report pain during cervical rotation 2/10 or less to increase participation in

## 2018-01-02 ENCOUNTER — HOSPITAL ENCOUNTER (OUTPATIENT)
Dept: OCCUPATIONAL THERAPY | Age: 38
Setting detail: THERAPIES SERIES
Discharge: HOME | End: 2018-01-02
Payer: COMMERCIAL

## 2018-01-03 ENCOUNTER — OFFICE VISIT (OUTPATIENT)
Dept: PHYSICAL MEDICINE AND REHAB | Age: 38
End: 2018-01-03

## 2018-01-03 VITALS
BODY MASS INDEX: 30.31 KG/M2 | DIASTOLIC BLOOD PRESSURE: 80 MMHG | SYSTOLIC BLOOD PRESSURE: 110 MMHG | WEIGHT: 200 LBS | HEIGHT: 68 IN

## 2018-01-03 DIAGNOSIS — F41.1 ANXIETY STATE: ICD-10-CM

## 2018-01-03 DIAGNOSIS — G89.29 CHRONIC PAIN OF LEFT KNEE: ICD-10-CM

## 2018-01-03 DIAGNOSIS — F90.0 ATTENTION DEFICIT HYPERACTIVITY DISORDER (ADHD), PREDOMINANTLY INATTENTIVE TYPE: ICD-10-CM

## 2018-01-03 DIAGNOSIS — M25.562 CHRONIC PAIN OF LEFT KNEE: ICD-10-CM

## 2018-01-03 DIAGNOSIS — M54.40 ACUTE RIGHT-SIDED LOW BACK PAIN WITH SCIATICA, SCIATICA LATERALITY UNSPECIFIED: Primary | Chronic | ICD-10-CM

## 2018-01-03 DIAGNOSIS — M51.36 DDD (DEGENERATIVE DISC DISEASE), LUMBAR: ICD-10-CM

## 2018-01-03 DIAGNOSIS — Z79.899 HIGH RISK MEDICATION USE: Chronic | ICD-10-CM

## 2018-01-03 DIAGNOSIS — M54.2 NECK PAIN: ICD-10-CM

## 2018-01-03 PROBLEM — E44.1 MALNUTRITION OF MILD DEGREE (HCC): Status: ACTIVE | Noted: 2017-12-17

## 2018-01-03 PROBLEM — D72.829 LEUKOCYTOSIS: Status: ACTIVE | Noted: 2017-12-16

## 2018-01-03 PROCEDURE — 1036F TOBACCO NON-USER: CPT | Performed by: PHYSICAL MEDICINE & REHABILITATION

## 2018-01-03 PROCEDURE — 99215 OFFICE O/P EST HI 40 MIN: CPT | Performed by: PHYSICAL MEDICINE & REHABILITATION

## 2018-01-03 PROCEDURE — G8427 DOCREV CUR MEDS BY ELIG CLIN: HCPCS | Performed by: PHYSICAL MEDICINE & REHABILITATION

## 2018-01-03 PROCEDURE — G8484 FLU IMMUNIZE NO ADMIN: HCPCS | Performed by: PHYSICAL MEDICINE & REHABILITATION

## 2018-01-03 PROCEDURE — G8417 CALC BMI ABV UP PARAM F/U: HCPCS | Performed by: PHYSICAL MEDICINE & REHABILITATION

## 2018-01-03 ASSESSMENT — ENCOUNTER SYMPTOMS
EYES NEGATIVE: 1
DIARRHEA: 0
CONSTIPATION: 0
BACK PAIN: 1
APNEA: 0
WHEEZING: 0
NAUSEA: 0
SHORTNESS OF BREATH: 0
CHEST TIGHTNESS: 0
ABDOMINAL PAIN: 0
VOMITING: 0

## 2018-01-03 NOTE — PROGRESS NOTES
manipulation-I cautioned him to take a card close look as to why he wanted these medications in the first place whether he wants them because they make him feel emotionally better and with emotional issues may be going on in his life that would make him seek that out. Neck Pain    This is a chronic problem. The current episode started more than 1 year ago. The problem occurs constantly. The pain is associated with nothing. The pain is present in the occipital region. The quality of the pain is described as aching. The pain is at a severity of 4/10. The pain is severe. The symptoms are aggravated by bending. Stiffness is present in the morning. Associated symptoms include numbness, tingling and weakness. Pertinent negatives include no chest pain or headaches. He has tried chiropractic manipulation, heat, home exercises, acetaminophen, ice and NSAIDs for the symptoms. The treatment provided mild relief. Back Pain   This is a chronic problem. The current episode started more than 1 year ago. The problem occurs constantly. The problem is unchanged. The pain is present in the gluteal. The pain radiates to the left knee, left thigh and left foot. The pain is at a severity of 8/10. The pain is severe. The pain is worse during the day. The symptoms are aggravated by bending, sitting, twisting and standing. Associated symptoms include numbness, tingling and weakness. Pertinent negatives include no abdominal pain, chest pain or headaches. Risk factors include lack of exercise, obesity, poor posture and sedentary lifestyle. He has tried analgesics, heat, chiropractic manipulation, home exercises and NSAIDs for the symptoms. The treatment provided mild relief.        Past Medical History:   Diagnosis Date    Abdominal pain, epigastric 3/23/2017    ADHD (attention deficit hyperactivity disorder)     Anxiety state, unspecified     Bipolar disorder, unspecified     Carpal tunnel syndrome, bilateral     emg pos    Fibromyalgia     dr Mil Godinez Insomnia, unspecified      Past Surgical History:   Procedure Laterality Date    HERNIA REPAIR       Social History     Social History    Marital status:      Spouse name: Cheryl Chacon Number of children: 3    Years of education: 15     Occupational History    Security      Social History Main Topics    Smoking status: Former Smoker     Years: 10.00     Quit date: 2005    Smokeless tobacco: Never Used    Alcohol use Yes      Comment: occasional    Drug use: No    Sexual activity: Not Asked     Other Topics Concern    None     Social History Narrative    Patient lives in a split level home with his wife and 3 children. Youngest child was diagnoses with Autism Epilepsy  around [id number]. Does do a lot of lifting of the youngest son. ADL's without any assistance. Family History   Problem Relation Age of Onset    Cancer Father        No Known Allergies    Review of Systems   Constitutional: Negative for activity change, diaphoresis, fatigue and unexpected weight change. HENT: Negative. Eyes: Negative. Respiratory: Negative for apnea, chest tightness, shortness of breath and wheezing. Cardiovascular: Negative for chest pain, palpitations and leg swelling. Gastrointestinal: Negative for abdominal pain, constipation, diarrhea, nausea and vomiting. Endocrine: Negative. Genitourinary: Negative. Musculoskeletal: Positive for back pain, joint swelling, myalgias, neck pain and neck stiffness. Negative for arthralgias and gait problem. Skin: Positive for rash. Neurological: Positive for tingling, weakness and numbness. Negative for dizziness, light-headedness and headaches. Hematological: Negative. Psychiatric/Behavioral: Positive for agitation, dysphoric mood and sleep disturbance. The patient is nervous/anxious.         Objective    Vitals:    01/03/18 1143   BP: 110/80   Weight: 200 lb (90.7 kg)   Height: 5' 8\" (1.727 m)     Pain Score: EIGHT Anxiety state     3. Attention deficit hyperactivity disorder (ADHD), predominantly inattentive type     4. Chronic pain of left knee     5. Neck pain     6. DDD (degenerative disc disease), lumbar     7. High risk medication use-Norco         I am also concerned by lifestyle and mood issues including:    Past Medical History:   Diagnosis Date    Abdominal pain, epigastric 3/23/2017    ADHD (attention deficit hyperactivity disorder)     Anxiety state, unspecified     Bipolar disorder, unspecified     Carpal tunnel syndrome, bilateral     emg pos    Fibromyalgia     dr Leatha Kaur    Insomnia, unspecified            Given their medication, chronic pain and lifestyle and medications they are at risk for :    Falls, constipation, addiction  Loss of livelyhood due to severe pain, debility, weight gain and  vitamin D deficiency    The patient was educated regarding proper diet, fitness routine, and regulatory restrictions concerning pain medications. Previous notes, comprehensive past medical, surgical, family history, and diagnostics were reviewed. Patient education and councelling were provided regarding off label use,treatment options and medication and injection risks. Current and old OARRS ([place] Automated Prescription Reporting System) records reviewed, all refills reviewed since last visit,  Behavioral agreement/[facility] regulations   and Toxicology screen was reviewed with patient and is up to date. He has marijuana system. There are no current red flags. They are making good progress regarding pain relief, they are performing at a functional level regarding activities of daily living and psychological functioning, they're not having any adverse effects or side effects from the current medications, and I do see findings of aberrant drug taking her addiction related behaviors. See my discussion above    Attestation: The Prescription Monitoring Report for this patient was reviewed today.  Wild Prieto score: 23-high risk            Injections or Epidurals:  Injection options were discussed-TPs as needed. Patient gave verbal consent to ordered injections. See follow-up plans for planned injections. Supplements:  Vitamin D,   Education was given on:   Dietary and Fitness             Follow up with Primary Care Physician regarding their general medical needs. They are to follow up in 2 months to review medication, efficacy of injections, pill counts, OARRS check, SOAPPR assessment, review diagnostics, to review previous and future treatment plans and assess appropriateness for continued therapy. New Diagnostics  He is to return to work as tolerated.     Santa Barragan, DO

## 2018-01-03 NOTE — Clinical Note
I have serious concerns about his drug-seeking behaviors. His story is tangential he is often manic or somatic during the visits and often demanding narcotic medications. His pain had been in his neck he recently had an MRI which is negative. I have asked him to be honest with himself and others regarding his pain and to evaluate why he is then so caught up with these medications. He mentioned that he felt that Dr. Gregg Vallejo had gotten started on taking  Pain medications and that was not really his fault. I cautioned him against blaming others for his drug-seeking behaviors and heat seem to have very poor insight into this. I'm advising you and anybody else involved in his case to not use narcotics if at all possible.

## 2018-01-04 ENCOUNTER — HOSPITAL ENCOUNTER (OUTPATIENT)
Dept: OCCUPATIONAL THERAPY | Age: 38
Setting detail: THERAPIES SERIES
Discharge: HOME | End: 2018-01-04
Payer: COMMERCIAL

## 2018-01-09 ENCOUNTER — CLINICAL DOCUMENTATION (OUTPATIENT)
Dept: OCCUPATIONAL THERAPY | Age: 38
End: 2018-01-09

## 2018-02-09 RX ORDER — DEXTROAMPHETAMINE SACCHARATE, AMPHETAMINE ASPARTATE, DEXTROAMPHETAMINE SULFATE AND AMPHETAMINE SULFATE 7.5; 7.5; 7.5; 7.5 MG/1; MG/1; MG/1; MG/1
30 TABLET ORAL 2 TIMES DAILY
Qty: 60 TABLET | Refills: 0 | Status: SHIPPED | OUTPATIENT
Start: 2018-02-09 | End: 2018-11-08

## 2018-02-14 DIAGNOSIS — G47.00 INSOMNIA, UNSPECIFIED TYPE: ICD-10-CM

## 2018-02-15 RX ORDER — ZOLPIDEM TARTRATE 10 MG/1
10 TABLET ORAL NIGHTLY PRN
Qty: 30 TABLET | Refills: 0 | Status: SHIPPED | OUTPATIENT
Start: 2018-02-15 | End: 2018-03-17

## 2018-03-14 DIAGNOSIS — G47.00 INSOMNIA, UNSPECIFIED TYPE: ICD-10-CM

## 2018-03-14 RX ORDER — ZOLPIDEM TARTRATE 10 MG/1
10 TABLET ORAL NIGHTLY PRN
Qty: 30 TABLET | Refills: 0 | OUTPATIENT
Start: 2018-03-14 | End: 2018-04-13

## 2018-03-29 ENCOUNTER — OFFICE VISIT (OUTPATIENT)
Dept: PRIMARY CARE CLINIC | Age: 38
End: 2018-03-29
Payer: COMMERCIAL

## 2018-03-29 VITALS
BODY MASS INDEX: 32.45 KG/M2 | WEIGHT: 214.1 LBS | TEMPERATURE: 98.4 F | OXYGEN SATURATION: 97 % | RESPIRATION RATE: 14 BRPM | HEART RATE: 85 BPM | DIASTOLIC BLOOD PRESSURE: 80 MMHG | HEIGHT: 68 IN | SYSTOLIC BLOOD PRESSURE: 122 MMHG

## 2018-03-29 DIAGNOSIS — G47.26 SHIFTING SLEEP-WORK SCHEDULE: ICD-10-CM

## 2018-03-29 DIAGNOSIS — F90.9 ATTENTION DEFICIT HYPERACTIVITY DISORDER (ADHD), UNSPECIFIED ADHD TYPE: Primary | ICD-10-CM

## 2018-03-29 PROCEDURE — G8417 CALC BMI ABV UP PARAM F/U: HCPCS | Performed by: INTERNAL MEDICINE

## 2018-03-29 PROCEDURE — 1036F TOBACCO NON-USER: CPT | Performed by: INTERNAL MEDICINE

## 2018-03-29 PROCEDURE — G8427 DOCREV CUR MEDS BY ELIG CLIN: HCPCS | Performed by: INTERNAL MEDICINE

## 2018-03-29 PROCEDURE — G8482 FLU IMMUNIZE ORDER/ADMIN: HCPCS | Performed by: INTERNAL MEDICINE

## 2018-03-29 PROCEDURE — 99213 OFFICE O/P EST LOW 20 MIN: CPT | Performed by: INTERNAL MEDICINE

## 2018-03-29 RX ORDER — CYCLOBENZAPRINE HCL 10 MG
TABLET ORAL
COMMUNITY
Start: 2018-01-19 | End: 2018-03-29

## 2018-03-29 RX ORDER — ZOLPIDEM TARTRATE 10 MG/1
10 TABLET ORAL NIGHTLY PRN
Qty: 30 TABLET | Refills: 2 | Status: SHIPPED | OUTPATIENT
Start: 2018-03-29 | End: 2018-04-28

## 2018-03-29 RX ORDER — DEXTROAMPHETAMINE SACCHARATE, AMPHETAMINE ASPARTATE, DEXTROAMPHETAMINE SULFATE AND AMPHETAMINE SULFATE 7.5; 7.5; 7.5; 7.5 MG/1; MG/1; MG/1; MG/1
30 TABLET ORAL 2 TIMES DAILY
Qty: 60 TABLET | Refills: 0 | Status: SHIPPED | OUTPATIENT
Start: 2018-04-29 | End: 2018-03-29 | Stop reason: SDUPTHER

## 2018-03-29 RX ORDER — ZOLPIDEM TARTRATE 10 MG/1
TABLET ORAL
COMMUNITY
End: 2018-03-29 | Stop reason: SDUPTHER

## 2018-03-29 RX ORDER — DEXTROAMPHETAMINE SACCHARATE, AMPHETAMINE ASPARTATE, DEXTROAMPHETAMINE SULFATE AND AMPHETAMINE SULFATE 7.5; 7.5; 7.5; 7.5 MG/1; MG/1; MG/1; MG/1
30 TABLET ORAL 2 TIMES DAILY
Qty: 60 TABLET | Refills: 0 | Status: SHIPPED | OUTPATIENT
Start: 2018-05-29 | End: 2018-08-15 | Stop reason: SDUPTHER

## 2018-03-29 RX ORDER — DEXTROAMPHETAMINE SACCHARATE, AMPHETAMINE ASPARTATE, DEXTROAMPHETAMINE SULFATE AND AMPHETAMINE SULFATE 7.5; 7.5; 7.5; 7.5 MG/1; MG/1; MG/1; MG/1
30 TABLET ORAL 2 TIMES DAILY
Qty: 60 TABLET | Refills: 0 | Status: SHIPPED | OUTPATIENT
Start: 2018-03-29 | End: 2018-03-29 | Stop reason: SDUPTHER

## 2018-03-29 NOTE — PROGRESS NOTES
Formerly [facility] 06/06/2013    Attention deficit disorder 06/06/2013     Past Surgical History:   Procedure Laterality Date    HERNIA REPAIR       Family History   Problem Relation Age of Onset    Cancer Father      Social History     Social History    Marital status:      Spouse name: David Gomes Number of children: 3    Years of education: 15     Occupational History    Security      Social History Main Topics    Smoking status: Former Smoker     Years: 10.00     Quit date: 2005    Smokeless tobacco: Never Used    Alcohol use Yes      Comment: occasional    Drug use: No    Sexual activity: Not Asked     Other Topics Concern    None     Social History Narrative    Patient lives in a split level home with his wife and 3 children. Youngest child was diagnoses with Autism Epilepsy  around [id number]. Does do a lot of lifting of the youngest son. ADL's without any assistance. No Known Allergies    Review of Systems   Constitutional: Negative for fatigue and fever. HENT: Negative for trouble swallowing and voice change. Eyes: Negative for photophobia and visual disturbance. Respiratory: Negative for choking and shortness of breath. Cardiovascular: Negative for chest pain and palpitations. Gastrointestinal: Negative for nausea and vomiting. Genitourinary: Negative for decreased urine volume, testicular pain and urgency. Skin: Negative for rash. Neurological: Negative for tremors and syncope. Hematological: Does not bruise/bleed easily. Psychiatric/Behavioral: Positive for dysphoric mood and sleep disturbance. Negative for suicidal ideas. The patient is nervous/anxious and has insomnia.             Vitals:    03/29/18 0906   BP: 122/80   Site: Right Arm   Position: Sitting   Cuff Size: Large Adult   Pulse: 85   Resp: 14   Temp: 98.4 °F (36.9 °C)   TempSrc: Tympanic   SpO2: 97%   Weight: 214 lb 1.6 oz (97.1 kg)   Height: 5' 8\" (1.727 m)       Physical Exam   Constitutional: He appears well-developed. HENT:   Head: Normocephalic. Eyes: EOM are normal. Pupils are equal, round, and reactive to light. Neck: Normal range of motion. Cardiovascular: Normal rate and regular rhythm. Pulmonary/Chest: Effort normal. No respiratory distress. He has no wheezes. He has no rales. Abdominal: He exhibits no distension. Musculoskeletal: Normal range of motion. Neurological: He is alert. Skin: Skin is dry. Assessment/Plan  Prasanth Blunt was seen today for adhd and insomnia. Diagnoses and all orders for this visit:    Attention deficit hyperactivity disorder (ADHD), unspecified ADHD type  -     Discontinue: amphetamine-dextroamphetamine (ADDERALL, 30MG,) 30 MG tablet; Take 1 tablet by mouth 2 times daily for 30 days. Earliest Fill Date: 3/29/18  -     Discontinue: amphetamine-dextroamphetamine (ADDERALL, 30MG,) 30 MG tablet; Take 1 tablet by mouth 2 times daily for 30 days. Earliest Fill Date: 4/29/18  -     amphetamine-dextroamphetamine (ADDERALL, 30MG,) 30 MG tablet; Take 1 tablet by mouth 2 times daily for 30 days. Earliest Fill Date: 5/29/18    Shifting sleep-work schedule  -     zolpidem (AMBIEN) 10 MG tablet; Take 1 tablet by mouth nightly as needed for Sleep for up to 30 days. No Follow-up on file.     Matt White MD

## 2018-03-30 ASSESSMENT — ENCOUNTER SYMPTOMS
TROUBLE SWALLOWING: 0
VOMITING: 0
SHORTNESS OF BREATH: 0
VOICE CHANGE: 0
CHOKING: 0
NAUSEA: 0
PHOTOPHOBIA: 0

## 2018-06-29 ENCOUNTER — OFFICE VISIT (OUTPATIENT)
Dept: PRIMARY CARE CLINIC | Age: 38
End: 2018-06-29
Payer: COMMERCIAL

## 2018-06-29 VITALS
TEMPERATURE: 97.4 F | HEART RATE: 86 BPM | DIASTOLIC BLOOD PRESSURE: 80 MMHG | WEIGHT: 209 LBS | HEIGHT: 68 IN | RESPIRATION RATE: 16 BRPM | OXYGEN SATURATION: 99 % | BODY MASS INDEX: 31.67 KG/M2 | SYSTOLIC BLOOD PRESSURE: 126 MMHG

## 2018-06-29 DIAGNOSIS — J30.1 ACUTE ALLERGIC RHINITIS DUE TO POLLEN, UNSPECIFIED SEASONALITY: ICD-10-CM

## 2018-06-29 DIAGNOSIS — F90.0 ATTENTION DEFICIT HYPERACTIVITY DISORDER (ADHD), PREDOMINANTLY INATTENTIVE TYPE: ICD-10-CM

## 2018-06-29 DIAGNOSIS — J01.10 ACUTE NON-RECURRENT FRONTAL SINUSITIS: Primary | ICD-10-CM

## 2018-06-29 PROBLEM — E44.1 MALNUTRITION OF MILD DEGREE (HCC): Status: RESOLVED | Noted: 2017-12-17 | Resolved: 2018-06-29

## 2018-06-29 PROCEDURE — G8427 DOCREV CUR MEDS BY ELIG CLIN: HCPCS | Performed by: FAMILY MEDICINE

## 2018-06-29 PROCEDURE — 99213 OFFICE O/P EST LOW 20 MIN: CPT | Performed by: FAMILY MEDICINE

## 2018-06-29 PROCEDURE — 96372 THER/PROPH/DIAG INJ SC/IM: CPT | Performed by: FAMILY MEDICINE

## 2018-06-29 PROCEDURE — G8417 CALC BMI ABV UP PARAM F/U: HCPCS | Performed by: FAMILY MEDICINE

## 2018-06-29 PROCEDURE — 1036F TOBACCO NON-USER: CPT | Performed by: FAMILY MEDICINE

## 2018-06-29 RX ORDER — CEFTRIAXONE 1 G/1
1 INJECTION, POWDER, FOR SOLUTION INTRAMUSCULAR; INTRAVENOUS ONCE
Status: COMPLETED | OUTPATIENT
Start: 2018-06-29 | End: 2018-06-29

## 2018-06-29 RX ORDER — TRIAMCINOLONE ACETONIDE 40 MG/ML
80 INJECTION, SUSPENSION INTRA-ARTICULAR; INTRAMUSCULAR ONCE
Status: COMPLETED | OUTPATIENT
Start: 2018-06-29 | End: 2018-06-29

## 2018-06-29 RX ORDER — FLUTICASONE PROPIONATE 50 MCG
2 SPRAY, SUSPENSION (ML) NASAL DAILY
Qty: 1 BOTTLE | Refills: 3 | Status: SHIPPED | OUTPATIENT
Start: 2018-06-29 | End: 2019-03-13

## 2018-06-29 RX ORDER — CEFDINIR 300 MG/1
600 CAPSULE ORAL DAILY
Qty: 28 CAPSULE | Refills: 0 | Status: SHIPPED | OUTPATIENT
Start: 2018-06-29 | End: 2018-07-13

## 2018-06-29 RX ADMIN — TRIAMCINOLONE ACETONIDE 80 MG: 40 INJECTION, SUSPENSION INTRA-ARTICULAR; INTRAMUSCULAR at 17:40

## 2018-06-29 RX ADMIN — CEFTRIAXONE 1 G: 1 INJECTION, POWDER, FOR SOLUTION INTRAMUSCULAR; INTRAVENOUS at 17:33

## 2018-06-29 ASSESSMENT — ENCOUNTER SYMPTOMS
HOARSE VOICE: 0
SHORTNESS OF BREATH: 0
COUGH: 0
SINUS PAIN: 1
SINUS PRESSURE: 1
SORE THROAT: 0
SWOLLEN GLANDS: 0

## 2018-07-19 RX ORDER — DEXTROAMPHETAMINE SACCHARATE, AMPHETAMINE ASPARTATE, DEXTROAMPHETAMINE SULFATE AND AMPHETAMINE SULFATE 7.5; 7.5; 7.5; 7.5 MG/1; MG/1; MG/1; MG/1
30 TABLET ORAL 2 TIMES DAILY
Qty: 60 TABLET | Refills: 0 | OUTPATIENT
Start: 2018-07-19 | End: 2018-08-18

## 2018-08-15 ENCOUNTER — OFFICE VISIT (OUTPATIENT)
Dept: PRIMARY CARE CLINIC | Age: 38
End: 2018-08-15
Payer: COMMERCIAL

## 2018-08-15 VITALS
DIASTOLIC BLOOD PRESSURE: 72 MMHG | WEIGHT: 210 LBS | TEMPERATURE: 97.2 F | HEIGHT: 68 IN | BODY MASS INDEX: 31.83 KG/M2 | RESPIRATION RATE: 16 BRPM | SYSTOLIC BLOOD PRESSURE: 108 MMHG | HEART RATE: 81 BPM | OXYGEN SATURATION: 98 %

## 2018-08-15 DIAGNOSIS — F90.9 ATTENTION DEFICIT HYPERACTIVITY DISORDER (ADHD), UNSPECIFIED ADHD TYPE: Primary | ICD-10-CM

## 2018-08-15 DIAGNOSIS — L98.9 LESION OF SKIN OF FACE: ICD-10-CM

## 2018-08-15 PROCEDURE — 99213 OFFICE O/P EST LOW 20 MIN: CPT | Performed by: INTERNAL MEDICINE

## 2018-08-15 PROCEDURE — 1036F TOBACCO NON-USER: CPT | Performed by: INTERNAL MEDICINE

## 2018-08-15 PROCEDURE — G8427 DOCREV CUR MEDS BY ELIG CLIN: HCPCS | Performed by: INTERNAL MEDICINE

## 2018-08-15 PROCEDURE — G8417 CALC BMI ABV UP PARAM F/U: HCPCS | Performed by: INTERNAL MEDICINE

## 2018-08-15 RX ORDER — DEXTROAMPHETAMINE SACCHARATE, AMPHETAMINE ASPARTATE, DEXTROAMPHETAMINE SULFATE AND AMPHETAMINE SULFATE 7.5; 7.5; 7.5; 7.5 MG/1; MG/1; MG/1; MG/1
30 TABLET ORAL 2 TIMES DAILY
Qty: 60 TABLET | Refills: 0 | Status: SHIPPED | OUTPATIENT
Start: 2018-09-15 | End: 2018-11-08

## 2018-08-15 RX ORDER — DEXTROAMPHETAMINE SACCHARATE, AMPHETAMINE ASPARTATE, DEXTROAMPHETAMINE SULFATE AND AMPHETAMINE SULFATE 7.5; 7.5; 7.5; 7.5 MG/1; MG/1; MG/1; MG/1
30 TABLET ORAL 2 TIMES DAILY
Qty: 60 TABLET | Refills: 0 | Status: SHIPPED | OUTPATIENT
Start: 2018-10-15 | End: 2019-03-13

## 2018-08-15 RX ORDER — DEXTROAMPHETAMINE SACCHARATE, AMPHETAMINE ASPARTATE, DEXTROAMPHETAMINE SULFATE AND AMPHETAMINE SULFATE 7.5; 7.5; 7.5; 7.5 MG/1; MG/1; MG/1; MG/1
30 TABLET ORAL 2 TIMES DAILY
Qty: 60 TABLET | Refills: 0 | Status: SHIPPED | OUTPATIENT
Start: 2018-08-15 | End: 2018-11-08

## 2018-08-15 NOTE — PROGRESS NOTES
to light. Conjunctivae and EOM are normal.   Neck: Normal range of motion. No thyromegaly present. Cardiovascular: Normal rate and regular rhythm. Pulmonary/Chest: Effort normal. No respiratory distress. He has no wheezes. Abdominal: Soft. He exhibits no distension. Musculoskeletal: Normal range of motion. Neurological: He is alert. Skin: Skin is dry. Assessment/Plan  Sylvie Green was seen today for adhd. Diagnoses and all orders for this visit:    Attention deficit hyperactivity disorder (ADHD), unspecified ADHD type  -     amphetamine-dextroamphetamine (ADDERALL, 30MG,) 30 MG tablet; Take 1 tablet by mouth 2 times daily for 30 days. Tami Mates Date: 8/15/18  -     amphetamine-dextroamphetamine (ADDERALL, 30MG,) 30 MG tablet; Take 1 tablet by mouth 2 times daily for 30 days. Tami Mates Date: 9/15/18  -     amphetamine-dextroamphetamine (ADDERALL, 30MG,) 30 MG tablet; Take 1 tablet by mouth 2 times daily for 30 days. Tami Mates Date: 10/15/18    Lesion of skin of face  -     Referral To Dermatology - ([facility]) Florinda Holliday MD        Return in about 3 months (around 11/15/2018), or if symptoms worsen or fail to improve.     Allen Dejesus MD

## 2018-08-20 ASSESSMENT — ENCOUNTER SYMPTOMS
VOICE CHANGE: 0
NAUSEA: 0
VOMITING: 0
TROUBLE SWALLOWING: 0
SHORTNESS OF BREATH: 0
PHOTOPHOBIA: 0
CHOKING: 0

## 2018-11-08 ENCOUNTER — OFFICE VISIT (OUTPATIENT)
Dept: PRIMARY CARE CLINIC | Age: 38
End: 2018-11-08
Payer: COMMERCIAL

## 2018-11-08 VITALS
SYSTOLIC BLOOD PRESSURE: 118 MMHG | TEMPERATURE: 97.5 F | DIASTOLIC BLOOD PRESSURE: 84 MMHG | BODY MASS INDEX: 31.98 KG/M2 | RESPIRATION RATE: 14 BRPM | HEIGHT: 68 IN | HEART RATE: 92 BPM | OXYGEN SATURATION: 98 % | WEIGHT: 211 LBS

## 2018-11-08 DIAGNOSIS — F90.0 ATTENTION DEFICIT HYPERACTIVITY DISORDER (ADHD), PREDOMINANTLY INATTENTIVE TYPE: Primary | ICD-10-CM

## 2018-11-08 DIAGNOSIS — J01.01 ACUTE RECURRENT MAXILLARY SINUSITIS: ICD-10-CM

## 2018-11-08 PROCEDURE — G8427 DOCREV CUR MEDS BY ELIG CLIN: HCPCS | Performed by: INTERNAL MEDICINE

## 2018-11-08 PROCEDURE — G8417 CALC BMI ABV UP PARAM F/U: HCPCS | Performed by: INTERNAL MEDICINE

## 2018-11-08 PROCEDURE — 99213 OFFICE O/P EST LOW 20 MIN: CPT | Performed by: INTERNAL MEDICINE

## 2018-11-08 PROCEDURE — 1036F TOBACCO NON-USER: CPT | Performed by: INTERNAL MEDICINE

## 2018-11-08 PROCEDURE — G8484 FLU IMMUNIZE NO ADMIN: HCPCS | Performed by: INTERNAL MEDICINE

## 2018-11-08 RX ORDER — DEXTROAMPHETAMINE SACCHARATE, AMPHETAMINE ASPARTATE, DEXTROAMPHETAMINE SULFATE AND AMPHETAMINE SULFATE 7.5; 7.5; 7.5; 7.5 MG/1; MG/1; MG/1; MG/1
30 TABLET ORAL 2 TIMES DAILY
Qty: 60 TABLET | Refills: 0 | Status: SHIPPED | OUTPATIENT
Start: 2018-12-13 | End: 2019-03-13

## 2018-11-08 RX ORDER — DEXTROAMPHETAMINE SACCHARATE, AMPHETAMINE ASPARTATE, DEXTROAMPHETAMINE SULFATE AND AMPHETAMINE SULFATE 7.5; 7.5; 7.5; 7.5 MG/1; MG/1; MG/1; MG/1
30 TABLET ORAL 2 TIMES DAILY
Qty: 60 TABLET | Refills: 0 | Status: SHIPPED | OUTPATIENT
Start: 2018-11-14 | End: 2019-03-13

## 2018-11-08 RX ORDER — LEVOFLOXACIN 500 MG/1
500 TABLET, FILM COATED ORAL DAILY
Qty: 10 TABLET | Refills: 0 | Status: SHIPPED | OUTPATIENT
Start: 2018-11-08 | End: 2018-11-18

## 2018-11-08 RX ORDER — DEXTROAMPHETAMINE SACCHARATE, AMPHETAMINE ASPARTATE, DEXTROAMPHETAMINE SULFATE AND AMPHETAMINE SULFATE 7.5; 7.5; 7.5; 7.5 MG/1; MG/1; MG/1; MG/1
30 TABLET ORAL 2 TIMES DAILY
Qty: 60 TABLET | Refills: 0 | Status: SHIPPED | OUTPATIENT
Start: 2019-01-12 | End: 2020-08-03 | Stop reason: SDUPTHER

## 2018-11-08 NOTE — PROGRESS NOTES
Kaden Meza 43 y.o. male presents today with   Chief Complaint   Patient presents with    Sinusitis     X 2 weeks. Cc/o sinus pressure and difficulty earing out of left ear.  ADHD     Follow up. Medication refill for Adderall 30 MG BID. Mental Health Problem   The primary symptoms include dysphoric mood. The current episode started more than 1 month ago. The onset of the illness is precipitated by emotional stress and a stressful event. The degree of incapacity that he is experiencing as a consequence of his illness is mild. Additional symptoms of the illness include attention impairment and distractible. Additional symptoms of the illness do not include fatigue. Sinusitis   This is a new problem. The current episode started in the past 7 days. The problem is unchanged. There has been no fever. Associated symptoms include sinus pressure and a sore throat. Pertinent negatives include no shortness of breath.        Past Medical History:   Diagnosis Date    Abdominal pain, epigastric 3/23/2017    ADHD (attention deficit hyperactivity disorder)     Anxiety state, unspecified     Bipolar disorder, unspecified ([facility] [address]     Carpal tunnel syndrome, bilateral     emg pos    Fibromyalgia     dr Julianna Puckett    Insomnia, unspecified      Patient Active Problem List    Diagnosis Date Noted    Back pain - 11/29/17 OARRS PM&R, 01/02/18 OARRS PM&R, 12/12/17 Tox Screen positive Hydrocodone, Amphetamines, Marijuana, 09/29/17 Med Contract PM&R  06/06/2013     Priority: High    High risk medication use-Norco 01/03/2018    Leukocytosis 12/16/2017    Carpal tunnel syndrome 11/26/2017    C7 radiculopathy 11/03/2017    Neuropathy of right suprascapular nerve 11/03/2017    Occipital neuralgia of right side 11/03/2017    Neuropathy involving both lower extremities 11/02/2017    Lumbosacral radiculopathy at L5 11/02/2017    Lumbar radiculopathy 10/12/2017    Chronic pain of left knee 09/29/2017    Neck pain 09/29/2017    DDD (degenerative disc disease), lumbar 09/29/2017    TOS (thoracic outlet syndrome) 09/29/2017    Medical marijuana use 09/29/2017    Abdominal pain, epigastric 03/23/2017    Attention deficit 03/26/2014    Anxiety state 06/06/2013    Attention deficit disorder 06/06/2013     Past Surgical History:   Procedure Laterality Date    HERNIA REPAIR       Family History   Problem Relation Age of Onset    Cancer Father      Social History     Social History    Marital status:      Spouse name: Ramón Reno Number of children: 3    Years of education: 15     Occupational History    Security      Social History Main Topics    Smoking status: Former Smoker     Years: 10.00     Quit date: 2005    Smokeless tobacco: Never Used    Alcohol use Yes      Comment: occasional    Drug use: No    Sexual activity: Not Asked     Other Topics Concern    None     Social History Narrative    Patient lives in a split level home with his wife and 3 children. Youngest child was diagnoses with Autism Epilepsy  around [id number]. Does do a lot of lifting of the youngest son. ADL's without any assistance. No Known Allergies    Review of Systems   Constitutional: Negative for fatigue and fever. HENT: Positive for sinus pressure and sore throat. Negative for trouble swallowing and voice change. Eyes: Negative for photophobia and visual disturbance. Respiratory: Negative for choking and shortness of breath. Cardiovascular: Negative for chest pain and palpitations. Gastrointestinal: Negative for nausea and vomiting. Genitourinary: Negative for decreased urine volume, testicular pain and urgency. Skin: Negative for rash. Neurological: Negative for tremors and syncope. Hematological: Does not bruise/bleed easily. Psychiatric/Behavioral: Positive for dysphoric mood. Negative for suicidal ideas.            Vitals:    11/08/18 0936   BP: 118/84   Site: Left Upper Arm   Position: Sitting   Cuff Size: Medium Adult   Pulse: 92   Resp: 14   Temp: 97.5 °F (36.4 °C)   TempSrc: Tympanic   SpO2: 98%   Weight: 211 lb (95.7 kg)   Height: 5' 8\" (1.727 m)       Physical Exam   Constitutional: He appears well-developed and well-nourished. HENT:   Head: Normocephalic and atraumatic. Eyes: Pupils are equal, round, and reactive to light. Conjunctivae and EOM are normal.   Neck: Normal range of motion. Cardiovascular: Normal rate and regular rhythm. Pulmonary/Chest: Effort normal. No respiratory distress. He has no wheezes. Abdominal: Soft. Bowel sounds are normal.   Musculoskeletal: Normal range of motion. Neurological: He is alert. Skin: Skin is dry. Assessment/Plan  Jnae Johnson was seen today for sinusitis and adhd. Diagnoses and all orders for this visit:    Attention deficit hyperactivity disorder (ADHD), predominantly inattentive type  -     amphetamine-dextroamphetamine (ADDERALL, 30MG,) 30 MG tablet; Take 1 tablet by mouth 2 times daily for 30 days. Gurindernda Said Date: 11/14/18  -     amphetamine-dextroamphetamine (ADDERALL, 30MG,) 30 MG tablet; Take 1 tablet by mouth 2 times daily for 30 days. Gurindernda Said Date: 12/13/18  -     amphetamine-dextroamphetamine (ADDERALL, 30MG,) 30 MG tablet; Take 1 tablet by mouth 2 times daily for 30 days. Angus Said Date: 1/12/19    Acute recurrent maxillary sinusitis  -     levofloxacin (LEVAQUIN) 500 MG tablet; Take 1 tablet by mouth daily for 10 days  -     Referral To Unknown External ENT        No Follow-up on file.     Hanna Bucio MD

## 2018-11-11 ASSESSMENT — ENCOUNTER SYMPTOMS
SHORTNESS OF BREATH: 0
SINUS PRESSURE: 1
SORE THROAT: 1
TROUBLE SWALLOWING: 0
PHOTOPHOBIA: 0
CHOKING: 0
VOMITING: 0
NAUSEA: 0
VOICE CHANGE: 0

## 2019-03-13 ENCOUNTER — OFFICE VISIT (OUTPATIENT)
Dept: PRIMARY CARE CLINIC | Age: 39
End: 2019-03-13
Payer: COMMERCIAL

## 2019-03-13 VITALS
HEIGHT: 68 IN | HEART RATE: 83 BPM | OXYGEN SATURATION: 98 % | BODY MASS INDEX: 29.86 KG/M2 | SYSTOLIC BLOOD PRESSURE: 120 MMHG | TEMPERATURE: 97.7 F | DIASTOLIC BLOOD PRESSURE: 78 MMHG | WEIGHT: 197 LBS | RESPIRATION RATE: 14 BRPM

## 2019-03-13 DIAGNOSIS — F32.A DEPRESSION, UNSPECIFIED DEPRESSION TYPE: ICD-10-CM

## 2019-03-13 DIAGNOSIS — F90.0 ATTENTION DEFICIT HYPERACTIVITY DISORDER (ADHD), PREDOMINANTLY INATTENTIVE TYPE: ICD-10-CM

## 2019-03-13 DIAGNOSIS — F41.9 ANXIETY: Primary | ICD-10-CM

## 2019-03-13 DIAGNOSIS — Z13.31 POSITIVE DEPRESSION SCREENING: ICD-10-CM

## 2019-03-13 PROCEDURE — 99213 OFFICE O/P EST LOW 20 MIN: CPT | Performed by: INTERNAL MEDICINE

## 2019-03-13 PROCEDURE — G8431 POS CLIN DEPRES SCRN F/U DOC: HCPCS | Performed by: INTERNAL MEDICINE

## 2019-03-13 PROCEDURE — G0444 DEPRESSION SCREEN ANNUAL: HCPCS | Performed by: INTERNAL MEDICINE

## 2019-03-13 PROCEDURE — 1036F TOBACCO NON-USER: CPT | Performed by: INTERNAL MEDICINE

## 2019-03-13 PROCEDURE — G8427 DOCREV CUR MEDS BY ELIG CLIN: HCPCS | Performed by: INTERNAL MEDICINE

## 2019-03-13 PROCEDURE — G8484 FLU IMMUNIZE NO ADMIN: HCPCS | Performed by: INTERNAL MEDICINE

## 2019-03-13 PROCEDURE — G8417 CALC BMI ABV UP PARAM F/U: HCPCS | Performed by: INTERNAL MEDICINE

## 2019-03-13 RX ORDER — PAROXETINE 10 MG/1
10 TABLET, FILM COATED ORAL DAILY
Qty: 30 TABLET | Refills: 3 | Status: SHIPPED | OUTPATIENT
Start: 2019-03-13 | End: 2020-02-20

## 2019-03-13 RX ORDER — LORAZEPAM 0.5 MG/1
0.5 TABLET ORAL 2 TIMES DAILY PRN
Qty: 60 TABLET | Refills: 2 | Status: SHIPPED | OUTPATIENT
Start: 2019-03-13 | End: 2019-06-11

## 2019-03-13 RX ORDER — DEXTROAMPHETAMINE SACCHARATE, AMPHETAMINE ASPARTATE, DEXTROAMPHETAMINE SULFATE AND AMPHETAMINE SULFATE 7.5; 7.5; 7.5; 7.5 MG/1; MG/1; MG/1; MG/1
30 TABLET ORAL 2 TIMES DAILY
Qty: 60 TABLET | Refills: 0 | Status: CANCELLED | OUTPATIENT
Start: 2019-03-13 | End: 2019-04-12

## 2019-03-13 ASSESSMENT — PATIENT HEALTH QUESTIONNAIRE - PHQ9
SUM OF ALL RESPONSES TO PHQ QUESTIONS 1-9: 15
8. MOVING OR SPEAKING SO SLOWLY THAT OTHER PEOPLE COULD HAVE NOTICED. OR THE OPPOSITE, BEING SO FIGETY OR RESTLESS THAT YOU HAVE BEEN MOVING AROUND A LOT MORE THAN USUAL: 0
5. POOR APPETITE OR OVEREATING: 2
9. THOUGHTS THAT YOU WOULD BE BETTER OFF DEAD, OR OF HURTING YOURSELF: 0
1. LITTLE INTEREST OR PLEASURE IN DOING THINGS: 3
SUM OF ALL RESPONSES TO PHQ QUESTIONS 1-9: 15
3. TROUBLE FALLING OR STAYING ASLEEP: 3
4. FEELING TIRED OR HAVING LITTLE ENERGY: 2
10. IF YOU CHECKED OFF ANY PROBLEMS, HOW DIFFICULT HAVE THESE PROBLEMS MADE IT FOR YOU TO DO YOUR WORK, TAKE CARE OF THINGS AT HOME, OR GET ALONG WITH OTHER PEOPLE: 1
6. FEELING BAD ABOUT YOURSELF - OR THAT YOU ARE A FAILURE OR HAVE LET YOURSELF OR YOUR FAMILY DOWN: 0
2. FEELING DOWN, DEPRESSED OR HOPELESS: 2
SUM OF ALL RESPONSES TO PHQ9 QUESTIONS 1 & 2: 5
7. TROUBLE CONCENTRATING ON THINGS, SUCH AS READING THE NEWSPAPER OR WATCHING TELEVISION: 3

## 2019-03-16 ASSESSMENT — ENCOUNTER SYMPTOMS
SHORTNESS OF BREATH: 0
NAUSEA: 0
CHOKING: 0
VOMITING: 0
VOICE CHANGE: 0
TROUBLE SWALLOWING: 0
PHOTOPHOBIA: 0

## 2019-07-02 ENCOUNTER — OFFICE VISIT (OUTPATIENT)
Dept: FAMILY MEDICINE CLINIC | Age: 39
End: 2019-07-02
Payer: COMMERCIAL

## 2019-07-02 VITALS
RESPIRATION RATE: 14 BRPM | HEIGHT: 68 IN | HEART RATE: 104 BPM | TEMPERATURE: 98.8 F | DIASTOLIC BLOOD PRESSURE: 80 MMHG | OXYGEN SATURATION: 97 % | BODY MASS INDEX: 31.31 KG/M2 | WEIGHT: 206.6 LBS | SYSTOLIC BLOOD PRESSURE: 124 MMHG

## 2019-07-02 DIAGNOSIS — R05.3 CHRONIC COUGH: Primary | ICD-10-CM

## 2019-07-02 PROCEDURE — 1036F TOBACCO NON-USER: CPT | Performed by: NURSE PRACTITIONER

## 2019-07-02 PROCEDURE — G8417 CALC BMI ABV UP PARAM F/U: HCPCS | Performed by: NURSE PRACTITIONER

## 2019-07-02 PROCEDURE — 99213 OFFICE O/P EST LOW 20 MIN: CPT | Performed by: NURSE PRACTITIONER

## 2019-07-02 PROCEDURE — G8427 DOCREV CUR MEDS BY ELIG CLIN: HCPCS | Performed by: NURSE PRACTITIONER

## 2019-07-02 RX ORDER — LORAZEPAM 0.5 MG/1
TABLET ORAL
Refills: 0 | COMMUNITY
Start: 2019-06-12 | End: 2020-02-20

## 2019-07-02 RX ORDER — IPRATROPIUM BROMIDE 42 UG/1
2 SPRAY, METERED NASAL 3 TIMES DAILY
Qty: 1 BOTTLE | Refills: 2 | Status: SHIPPED | OUTPATIENT
Start: 2019-07-02 | End: 2020-02-20

## 2019-07-02 ASSESSMENT — ENCOUNTER SYMPTOMS
COUGH: 1
SHORTNESS OF BREATH: 0
RHINORRHEA: 1
SINUS PRESSURE: 0
SINUS PAIN: 0
CHEST TIGHTNESS: 0
SORE THROAT: 0
WHEEZING: 0

## 2019-07-02 NOTE — PROGRESS NOTES
Subjective:      Patient ID: Raj Colon is a 35 y.o. male who presents today for:     Chief Complaint   Patient presents with    Cough     Since 6 months having cough dry and runny nose, pt states that last year had sugery in his nose for his sinus        HPI    Patient reports having a cough for past 6 months. States that he has chronic postnasal drainage and cough, worse at night. Has tried flonase, nasocort, claritin, decongestants, etc. Had surgery on his sinuses last year by Dr Gerri Tijerina.     Past Medical History:   Diagnosis Date    Abdominal pain, epigastric 3/23/2017    ADHD (attention deficit hyperactivity disorder)     Anxiety state, unspecified     B[facility] disorder, unspecified ([facility] [address]     Carpal tunnel syndrome, bilateral     emg pos    Fibromyalgia     dr Donell Valenzuela Insomnia, unspecified      Past Surgical History:   Procedure Laterality Date    HERNIA REPAIR       Family History   Problem Relation Age of Onset    Cancer Father      Social History     Socioeconomic History    Marital status:      Spouse name: Aayush Nesbitt Number of children: 3    Years of education: 15    Highest education level: Not on file   Occupational History    Occupation: Security   Social Needs    Financial resource strain: Not on file    Food insecurity:     Worry: Not on file     Inability: Not on file   Specific Media needs:     Medical: Not on file     Non-medical: Not on file   Tobacco Use    Smoking status: Former Smoker     Years: 10.00     Last attempt to quit: 2005     Years since quittin.5    Smokeless tobacco: Never Used   Substance and Sexual Activity    Alcohol use: Yes     Comment: occasional    Drug use: No    Sexual activity: Not on file   Lifestyle    Physical activity:     Days per week: Not on file     Minutes per session: Not on file    Stress: Not on file   Relationships    Social connections:     Talks on phone: Not on file     Gets together: Not on file     Attends Alevism service: Not on file     Active member of club or organization: Not on file     Attends meetings of clubs or organizations: Not on file     Relationship status: Not on file    Intimate partner violence:     Fear of current or ex partner: Not on file     Emotionally abused: Not on file     Physically abused: Not on file     Forced sexual activity: Not on file   Other Topics Concern    Not on file   Social History Narrative    Patient lives in a split level home with his wife and 3 children. Youngest child was diagnoses with Autism Epilepsy  around [phone]. Does do a lot of lifting of the youngest son. ADL's without any assistance. Current Outpatient Medications on File Prior to Visit   Medication Sig Dispense Refill    LORazepam (ATIVAN) 0.5 MG tablet   0    PARoxetine (PAXIL) 10 MG tablet Take 1 tablet by mouth daily 30 tablet 3    amphetamine-dextroamphetamine (ADDERALL, 30MG,) 30 MG tablet Take 1 tablet by mouth 2 times daily for 30 days. Christynathanael Lowe Date: 1/12/19 60 tablet 0     No current facility-administered medications on file prior to visit. Allergies:  Patient has no known allergies. Review of Systems   Constitutional: Negative for chills and fever. HENT: Positive for postnasal drip and rhinorrhea. Negative for sinus pressure, sinus pain and sore throat. Respiratory: Positive for cough. Negative for chest tightness, shortness of breath and wheezing. Cardiovascular: Negative. Objective:   /80   Pulse 104   Temp 98.8 °F (37.1 °C) (Temporal)   Resp 14   Ht 5' 8\" (1.727 m)   Wt 206 lb 9.6 oz (93.7 kg)   SpO2 97%   BMI 31.41 kg/m²     Physical Exam   Constitutional: He is oriented to person, place, and time. He appears well-developed and well-nourished. HENT:   Right Ear: A middle ear effusion is present. Left Ear: A middle ear effusion is present. Nose: Mucosal edema and rhinorrhea present.  Right sinus exhibits no maxillary sinus tenderness and no

## 2020-02-20 ENCOUNTER — OFFICE VISIT (OUTPATIENT)
Dept: PRIMARY CARE CLINIC | Age: 40
End: 2020-02-20
Payer: COMMERCIAL

## 2020-02-20 VITALS
RESPIRATION RATE: 14 BRPM | SYSTOLIC BLOOD PRESSURE: 128 MMHG | WEIGHT: 234 LBS | BODY MASS INDEX: 35.46 KG/M2 | HEIGHT: 68 IN | DIASTOLIC BLOOD PRESSURE: 83 MMHG | TEMPERATURE: 98 F

## 2020-02-20 PROCEDURE — G8417 CALC BMI ABV UP PARAM F/U: HCPCS | Performed by: INTERNAL MEDICINE

## 2020-02-20 PROCEDURE — 1036F TOBACCO NON-USER: CPT | Performed by: INTERNAL MEDICINE

## 2020-02-20 PROCEDURE — 99213 OFFICE O/P EST LOW 20 MIN: CPT | Performed by: INTERNAL MEDICINE

## 2020-02-20 PROCEDURE — G8482 FLU IMMUNIZE ORDER/ADMIN: HCPCS | Performed by: INTERNAL MEDICINE

## 2020-02-20 PROCEDURE — G8427 DOCREV CUR MEDS BY ELIG CLIN: HCPCS | Performed by: INTERNAL MEDICINE

## 2020-02-20 RX ORDER — OXYCODONE HYDROCHLORIDE AND ACETAMINOPHEN 5; 325 MG/1; MG/1
1 TABLET ORAL EVERY 6 HOURS PRN
Qty: 28 TABLET | Refills: 0 | Status: SHIPPED | OUTPATIENT
Start: 2020-02-20 | End: 2020-02-27

## 2020-02-20 RX ORDER — LIDOCAINE 5% 5 G/100G
1 CREAM TOPICAL 2 TIMES DAILY PRN
Qty: 30 G | Refills: 5 | Status: SHIPPED | OUTPATIENT
Start: 2020-02-20 | End: 2021-06-28 | Stop reason: CLARIF

## 2020-02-20 RX ORDER — VALACYCLOVIR HYDROCHLORIDE 1 G/1
1000 TABLET, FILM COATED ORAL 3 TIMES DAILY
Qty: 21 TABLET | Refills: 0 | Status: SHIPPED | OUTPATIENT
Start: 2020-02-20 | End: 2020-08-03 | Stop reason: SDUPTHER

## 2020-02-20 SDOH — ECONOMIC STABILITY: FOOD INSECURITY: WITHIN THE PAST 12 MONTHS, YOU WORRIED THAT YOUR FOOD WOULD RUN OUT BEFORE YOU GOT MONEY TO BUY MORE.: NEVER TRUE

## 2020-02-20 SDOH — ECONOMIC STABILITY: INCOME INSECURITY: HOW HARD IS IT FOR YOU TO PAY FOR THE VERY BASICS LIKE FOOD, HOUSING, MEDICAL CARE, AND HEATING?: NOT HARD AT ALL

## 2020-02-20 SDOH — ECONOMIC STABILITY: FOOD INSECURITY: WITHIN THE PAST 12 MONTHS, THE FOOD YOU BOUGHT JUST DIDN'T LAST AND YOU DIDN'T HAVE MONEY TO GET MORE.: NEVER TRUE

## 2020-02-20 ASSESSMENT — PATIENT HEALTH QUESTIONNAIRE - PHQ9
SUM OF ALL RESPONSES TO PHQ QUESTIONS 1-9: 0
2. FEELING DOWN, DEPRESSED OR HOPELESS: 0
SUM OF ALL RESPONSES TO PHQ QUESTIONS 1-9: 0
1. LITTLE INTEREST OR PLEASURE IN DOING THINGS: 0
SUM OF ALL RESPONSES TO PHQ9 QUESTIONS 1 & 2: 0

## 2020-02-20 NOTE — PROGRESS NOTES
Lluvia Player 35 y.o. male presents today with   Chief Complaint   Patient presents with    Rash     began upper Right leg, spreading to back and groin area, Very painful. Rash   This is a new problem. The current episode started in the past 7 days. The problem has been gradually improving since onset. The affected locations include the right buttock and right upper leg. The rash is characterized by blistering, redness and pain. Pertinent negatives include no fatigue, fever, shortness of breath or vomiting.        Past Medical History:   Diagnosis Date    Abdominal pain, epigastric 3/23/2017    ADHD (attention deficit hyperactivity disorder)     Anxiety state, unspecified     B[facility] disorder, unspecified ([facility] [address]     Carpal tunnel syndrome, bilateral     emg pos    Fibromyalgia     dr Candace Guevara    Insomnia, unspecified      Patient Active Problem List    Diagnosis Date Noted    Back pain - 11/29/17 OARRS PM&R, 01/02/18 OARRS PM&R, 12/12/17 Tox Screen positive Hydrocodone, Amphetamines, Marijuana, 09/29/17 Med Contract PM&R  06/06/2013     Priority: High    High risk medication use-Norco 01/03/2018    Leukocytosis 12/16/2017    Carpal tunnel syndrome 11/26/2017    C7 radiculopathy 11/03/2017    Neuropathy of right suprascapular nerve 11/03/2017    Occipital neuralgia of right side 11/03/2017    Neuropathy involving both lower extremities 11/02/2017    Lumbosacral radiculopathy at L5 11/02/2017    Lumbar radiculopathy 10/12/2017    Chronic pain of left knee 09/29/2017    Neck pain 09/29/2017    DDD (degenerative disc disease), lumbar 09/29/2017    TOS (thoracic outlet syndrome) 09/29/2017    Medical marijuana use 09/29/2017    Abdominal pain, epigastric 03/23/2017    Attention deficit 03/26/2014    Anxiety state 06/06/2013    Attention deficit disorder 06/06/2013     Past Surgical History:   Procedure Laterality Date    HERNIA REPAIR       Family History   Problem Relation Age of Onset

## 2020-02-25 ASSESSMENT — ENCOUNTER SYMPTOMS
SHORTNESS OF BREATH: 0
VOICE CHANGE: 0
CHOKING: 0
VOMITING: 0
NAUSEA: 0
TROUBLE SWALLOWING: 0
PHOTOPHOBIA: 0

## 2020-08-03 ENCOUNTER — OFFICE VISIT (OUTPATIENT)
Dept: PRIMARY CARE CLINIC | Age: 40
End: 2020-08-03
Payer: COMMERCIAL

## 2020-08-03 VITALS
HEART RATE: 101 BPM | TEMPERATURE: 98.1 F | SYSTOLIC BLOOD PRESSURE: 121 MMHG | HEIGHT: 68 IN | RESPIRATION RATE: 14 BRPM | DIASTOLIC BLOOD PRESSURE: 79 MMHG | WEIGHT: 228 LBS | OXYGEN SATURATION: 98 % | BODY MASS INDEX: 34.56 KG/M2

## 2020-08-03 PROCEDURE — 99213 OFFICE O/P EST LOW 20 MIN: CPT | Performed by: INTERNAL MEDICINE

## 2020-08-03 RX ORDER — DEXTROAMPHETAMINE SACCHARATE, AMPHETAMINE ASPARTATE, DEXTROAMPHETAMINE SULFATE AND AMPHETAMINE SULFATE 7.5; 7.5; 7.5; 7.5 MG/1; MG/1; MG/1; MG/1
30 TABLET ORAL 2 TIMES DAILY
Qty: 60 TABLET | Refills: 0 | Status: SHIPPED | OUTPATIENT
Start: 2020-08-03 | End: 2020-08-03 | Stop reason: SDUPTHER

## 2020-08-03 RX ORDER — KETOCONAZOLE 20 MG/G
CREAM TOPICAL
Qty: 30 G | Refills: 1 | Status: SHIPPED | OUTPATIENT
Start: 2020-08-03 | End: 2021-06-28 | Stop reason: CLARIF

## 2020-08-03 RX ORDER — DEXTROAMPHETAMINE SACCHARATE, AMPHETAMINE ASPARTATE, DEXTROAMPHETAMINE SULFATE AND AMPHETAMINE SULFATE 7.5; 7.5; 7.5; 7.5 MG/1; MG/1; MG/1; MG/1
30 TABLET ORAL 2 TIMES DAILY
Qty: 60 TABLET | Refills: 0 | Status: SHIPPED | OUTPATIENT
Start: 2020-10-01 | End: 2020-11-30 | Stop reason: SDUPTHER

## 2020-08-03 RX ORDER — DEXTROAMPHETAMINE SACCHARATE, AMPHETAMINE ASPARTATE, DEXTROAMPHETAMINE SULFATE AND AMPHETAMINE SULFATE 7.5; 7.5; 7.5; 7.5 MG/1; MG/1; MG/1; MG/1
30 TABLET ORAL 2 TIMES DAILY
Qty: 60 TABLET | Refills: 0 | Status: SHIPPED | OUTPATIENT
Start: 2020-09-02 | End: 2020-08-03 | Stop reason: SDUPTHER

## 2020-08-03 RX ORDER — VALACYCLOVIR HYDROCHLORIDE 1 G/1
1000 TABLET, FILM COATED ORAL 3 TIMES DAILY
Qty: 21 TABLET | Refills: 0 | Status: SHIPPED | OUTPATIENT
Start: 2020-08-03 | End: 2020-09-21 | Stop reason: SDUPTHER

## 2020-08-03 ASSESSMENT — ENCOUNTER SYMPTOMS
PHOTOPHOBIA: 0
VOICE CHANGE: 0
SHORTNESS OF BREATH: 0
TROUBLE SWALLOWING: 0
CHOKING: 0
VOMITING: 0
NAUSEA: 0

## 2020-08-03 NOTE — PROGRESS NOTES
Demarco Jolly 38 y.o. male presents today with   Chief Complaint   Patient presents with    Rash     right side of stomach x2 months        Rash   The current episode started more than 1 month ago. The problem has been waxing and waning since onset. The affected locations include the torso. The rash is characterized by dryness and itchiness. Pertinent negatives include no anorexia, fatigue, fever, shortness of breath or vomiting. Mental Health Problem   The primary symptoms include somatic symptoms. The current episode started more than 1 month ago. This is a recurrent problem. The somatic symptoms began more than 1 month ago. The somatic symptoms have been unchanged since their onset. The symptoms are moderate. Somatic symptoms do not include fatigue. The degree of incapacity that he is experiencing as a consequence of his illness is moderate. Additional symptoms of the illness include attention impairment and distractible. Additional symptoms of the illness do not include anhedonia, insomnia or fatigue.        Past Medical History:   Diagnosis Date    Abdominal pain, epigastric 3/23/2017    ADHD (attention deficit hyperactivity disorder)     Anxiety state, unspecified     [facility]     Carpal tunnel syndrome, bilateral     emg pos    Fibromyalgia     dr Shavonne Galvan    Insomnia, unspecified      Patient Active Problem List    Diagnosis Date Noted    Back pain - 11/29/17 OARRS PM&R, 01/02/18 OARRS PM&R, 12/12/17 Tox Screen positive Hydrocodone, Amphetamines, Marijuana, 09/29/17 Med Contract PM&R  06/06/2013     Priority: High    High risk medication use-Norco 01/03/2018    Leukocytosis 12/16/2017    Carpal tunnel syndrome 11/26/2017    C7 radiculopathy 11/03/2017    Neuropathy of right suprascapular nerve 11/03/2017    Occipital neuralgia of right side 11/03/2017    Neuropathy involving both lower extremities 11/02/2017    Lumbosacral radiculopathy at L5 11/02/2017    Lumbar radiculopathy 10/12/2017    Chronic pain of left knee 09/29/2017    Neck pain 09/29/2017    DDD (degenerative disc disease), lumbar 09/29/2017    TOS (thoracic outlet syndrome) 09/29/2017    Medical marijuana use 09/29/2017    Abdominal pain, epigastric 03/23/2017    Attention deficit 03/26/2014    Anxiety state 06/06/2013    Attention deficit disorder 06/06/2013     Past Surgical History:   Procedure Laterality Date    HERNIA REPAIR       Family History   Problem Relation Age of Onset    Cancer Father      Social History     Socioeconomic History    Marital status:      Spouse name: Beka Bhandari Number of children: 3    Years of education: 15    Highest education level: None   Occupational History    Occupation: Security   Social Needs    Financial resource strain: Not hard at all   [employer] insecurity     Worry: Never true     Inability: Never true   [employer] needs     Medical: None     Non-medical: None   Tobacco Use    Smoking status: Former Smoker     Years: 10.00     Last attempt to quit: 2005     Years since quitting: 15.6    Smokeless tobacco: Never Used   Substance and Sexual Activity    Alcohol use: Yes     Comment: occasional    Drug use: No    Sexual activity: None   Lifestyle    Physical activity     Days per week: None     Minutes per session: None    Stress: None   Relationships    Social connections     Talks on phone: None     Gets together: None     Attends Latter-day service: None     Active member of club or organization: None     Attends meetings of clubs or organizations: None     Relationship status: None    Intimate partner violence     Fear of current or ex partner: None     Emotionally abused: None     Physically abused: None     Forced sexual activity: None   Other Topics Concern    None   Social History Narrative    Patient lives in a split level home with his wife and 3 children. Youngest child was diagnoses with Autism Epilepsy  around [id number]. Does do a lot of lifting of the youngest son. ADL's without any assistance. No Known Allergies    Review of Systems   Constitutional: Negative for fatigue and fever. HENT: Negative for trouble swallowing and voice change. Eyes: Negative for photophobia and visual disturbance. Respiratory: Negative for choking and shortness of breath. Cardiovascular: Negative for chest pain and palpitations. Gastrointestinal: Negative for anorexia, nausea and vomiting. Genitourinary: Negative for urgency. Skin: Positive for rash. Neurological: Negative for tremors and syncope. Hematological: Does not bruise/bleed easily. Psychiatric/Behavioral: Positive for decreased concentration. Negative for suicidal ideas. The patient does not have insomnia. Vitals:    08/03/20 1451   BP: 121/79   Pulse: 101   Resp: 14   Temp: 98.1 °F (36.7 °C)   SpO2: 98%   Weight: 228 lb (103.4 kg)   Height: 5' 8\" (1.727 m)       Physical Exam  Constitutional:       Appearance: He is well-developed. HENT:      Head: Normocephalic and atraumatic. Mouth/Throat:      Mouth: Mucous membranes are moist.   Eyes:      Conjunctiva/sclera: Conjunctivae normal.      Pupils: Pupils are equal, round, and reactive to light. Neck:      Musculoskeletal: Normal range of motion. Cardiovascular:      Rate and Rhythm: Normal rate and regular rhythm. Pulmonary:      Effort: Pulmonary effort is normal. No respiratory distress. Breath sounds: No wheezing. Abdominal:      General: Bowel sounds are normal. There is no distension. Palpations: Abdomen is soft. Tenderness: There is no abdominal tenderness. Musculoskeletal: Normal range of motion. Skin:     General: Skin is dry. Neurological:      Mental Status: He is alert. Assessment/Plan  Serenaanibal Odonnell was seen today for rash.     Diagnoses and all orders for this visit:    Attention deficit hyperactivity disorder (ADHD), predominantly inattentive type  - Discontinue: amphetamine-dextroamphetamine (ADDERALL, 30MG,) 30 MG tablet; Take 1 tablet by mouth 2 times daily for 30 days. -     Discontinue: amphetamine-dextroamphetamine (ADDERALL, 30MG,) 30 MG tablet; Take 1 tablet by mouth 2 times daily for 30 days. -     amphetamine-dextroamphetamine (ADDERALL, 30MG,) 30 MG tablet; Take 1 tablet by mouth 2 times daily for 30 days. Herpes zoster without complication  -     valACYclovir (VALTREX) 1 g tablet; Take 1 tablet by mouth 3 times daily    Dermatitis  -     ketoconazole (NIZORAL) 2 % cream; Apply topically daily. No follow-ups on file.     Lindsay Cardona MD

## 2020-09-21 RX ORDER — VALACYCLOVIR HYDROCHLORIDE 1 G/1
1000 TABLET, FILM COATED ORAL 3 TIMES DAILY
Qty: 21 TABLET | Refills: 0 | Status: SHIPPED | OUTPATIENT
Start: 2020-09-21 | End: 2021-04-02 | Stop reason: SDUPTHER

## 2020-11-30 ENCOUNTER — VIRTUAL VISIT (OUTPATIENT)
Dept: PRIMARY CARE CLINIC | Age: 40
End: 2020-11-30
Payer: COMMERCIAL

## 2020-11-30 PROCEDURE — 99213 OFFICE O/P EST LOW 20 MIN: CPT | Performed by: INTERNAL MEDICINE

## 2020-11-30 RX ORDER — DEXTROAMPHETAMINE SACCHARATE, AMPHETAMINE ASPARTATE, DEXTROAMPHETAMINE SULFATE AND AMPHETAMINE SULFATE 7.5; 7.5; 7.5; 7.5 MG/1; MG/1; MG/1; MG/1
30 TABLET ORAL 2 TIMES DAILY
Qty: 60 TABLET | Refills: 0 | Status: SHIPPED | OUTPATIENT
Start: 2020-12-30 | End: 2020-11-30 | Stop reason: SDUPTHER

## 2020-11-30 RX ORDER — DEXTROAMPHETAMINE SACCHARATE, AMPHETAMINE ASPARTATE, DEXTROAMPHETAMINE SULFATE AND AMPHETAMINE SULFATE 7.5; 7.5; 7.5; 7.5 MG/1; MG/1; MG/1; MG/1
30 TABLET ORAL 2 TIMES DAILY
Qty: 60 TABLET | Refills: 0 | Status: SHIPPED | OUTPATIENT
Start: 2021-01-29 | End: 2021-03-15 | Stop reason: SDUPTHER

## 2020-11-30 RX ORDER — DEXTROAMPHETAMINE SACCHARATE, AMPHETAMINE ASPARTATE, DEXTROAMPHETAMINE SULFATE AND AMPHETAMINE SULFATE 7.5; 7.5; 7.5; 7.5 MG/1; MG/1; MG/1; MG/1
30 TABLET ORAL 2 TIMES DAILY
Qty: 60 TABLET | Refills: 0 | Status: SHIPPED | OUTPATIENT
Start: 2020-11-30 | End: 2020-11-30 | Stop reason: SDUPTHER

## 2020-12-05 ASSESSMENT — ENCOUNTER SYMPTOMS
VOMITING: 0
CHOKING: 0
TROUBLE SWALLOWING: 0
NAUSEA: 0
VOICE CHANGE: 0
SHORTNESS OF BREATH: 0
PHOTOPHOBIA: 0

## 2020-12-18 ENCOUNTER — VIRTUAL VISIT (OUTPATIENT)
Dept: PRIMARY CARE CLINIC | Age: 40
End: 2020-12-18
Payer: COMMERCIAL

## 2020-12-18 DIAGNOSIS — J06.9 VIRAL URI: ICD-10-CM

## 2020-12-18 DIAGNOSIS — Z20.822 ENCOUNTER BY TELEHEALTH FOR SUSPECTED COVID-19: ICD-10-CM

## 2020-12-18 PROCEDURE — G2012 BRIEF CHECK IN BY MD/QHP: HCPCS | Performed by: NURSE PRACTITIONER

## 2020-12-18 ASSESSMENT — ENCOUNTER SYMPTOMS
CHEST TIGHTNESS: 0
SINUS PAIN: 0
COLOR CHANGE: 0
DIARRHEA: 0
EYE PAIN: 0
ABDOMINAL PAIN: 0
PHOTOPHOBIA: 0
SHORTNESS OF BREATH: 0
EYE REDNESS: 0
COUGH: 0
TROUBLE SWALLOWING: 0
SINUS PRESSURE: 0
NAUSEA: 0
SORE THROAT: 1
ABDOMINAL DISTENTION: 0
EYE ITCHING: 0
RHINORRHEA: 1
VOMITING: 0
WHEEZING: 0
CONSTIPATION: 0
EYE DISCHARGE: 0
BACK PAIN: 0

## 2020-12-18 NOTE — PATIENT INSTRUCTIONS
Advance Care Planning  People with COVID-19 may have no symptoms, mild symptoms, such as fever, cough, and shortness of breath or they may have more severe illness, developing severe and fatal pneumonia. As a result, Advance Care Planning with attention to naming a health care decision maker (someone you trust to make healthcare decisions for you if you could not speak for yourself) and sharing other health care preferences is important BEFORE a possible health crisis. Please contact your Primary Care Provider to discuss Advance Care Planning. Preventing the Spread of Coronavirus Disease 2019 in Homes and Residential Communities  For the most recent information go to [email or web address]    Prevention steps for People with confirmed or suspected COVID-19 (including persons under investigation) who do not need to be hospitalized  and   People with confirmed COVID-19 who were hospitalized and determined to be medically stable to go home    Your healthcare provider and public health staff will evaluate whether you can be cared for at home. If it is determined that you do not need to be hospitalized and can be isolated at home, you will be monitored by staff from your local or state health department. You should follow the prevention steps below until a healthcare provider or local or state health department says you can return to your normal activities. Stay home except to get medical care  People who are mildly ill with COVID-19 are able to isolate at home during their illness. You should restrict activities outside your home, except for getting medical care. Do not go to work, school, or public areas. Avoid using public transportation, ride-sharing, or taxis.   Separate yourself from other people and animals in your home Wash your hands often with soap and water for at least 20 seconds, especially after blowing your nose, coughing, or sneezing; going to the bathroom; and before eating or preparing food. If soap and water are not readily available, use an alcohol-based hand  with at least 60% alcohol, covering all surfaces of your hands and rubbing them together until they feel dry. Soap and water are the best option if hands are visibly dirty. Avoid touching your eyes, nose, and mouth with unwashed hands. Avoid sharing personal household items  You should not share dishes, drinking glasses, cups, eating utensils, towels, or bedding with other people or pets in your home. After using these items, they should be washed thoroughly with soap and water. Clean all high-touch surfaces everyday  High touch surfaces include counters, tabletops, doorknobs, bathroom fixtures, toilets, phones, keyboards, tablets, and bedside tables. Also, clean any surfaces that may have blood, stool, or body fluids on them. Use a household cleaning spray or wipe, according to the label instructions. Labels contain instructions for safe and effective use of the cleaning product including precautions you should take when applying the product, such as wearing gloves and making sure you have good ventilation during use of the product.   Monitor your symptoms Seek prompt medical attention if your illness is worsening (e.g., difficulty breathing). Before seeking care, call your healthcare provider and tell them that you have, or are being evaluated for, COVID-19. Put on a facemask before you enter the facility. These steps will help the healthcare providers office to keep other people in the office or waiting room from getting infected or exposed. Ask your healthcare provider to call the local or state health department. Persons who are placed under active monitoring or facilitated self-monitoring should follow instructions provided by their local health department or occupational health professionals, as appropriate. When working with your local health department check their available hours. If you have a medical emergency and need to call 911, notify the dispatch personnel that you have, or are being evaluated for COVID-19. If possible, put on a facemask before emergency medical services arrive. Discontinuing home isolation  Patients with confirmed COVID-19 should remain under home isolation precautions until the risk of secondary transmission to others is thought to be low. The decision to discontinue home isolation precautions should be made on a case-by-case basis, in consultation with healthcare providers and state and local health departments.

## 2020-12-18 NOTE — LETTER
[facility]  [address] [facility] [address] [facility] [address]  Phone: [phone]  Fax: [phone] Hortensia Sherman, APRN - CNP        December 18, 2020     Patient: Faisal Tao   YOB: 1980   Date of Visit: 12/18/2020       To Whom It May Concern: It is my medical opinion that Faisal Tao should remain out of work until 12/28/2020. Tested for covid-19 12/18/2020, results may take 3-7 days to process. May return sooner if symptoms resolve and covid-19 is negative. If you have any questions or concerns, please don't hesitate to call.     Sincerely,        Asya Silverio, ÁLVARO - CNP

## 2020-12-18 NOTE — PROGRESS NOTES
2020    TELEHEALTH EVALUATION -- Audio/Visual ([facility])    Due to COVID 19 outbreak, patient's office visit was converted to a virtual visit. Patient was contacted and agreed to proceed with a virtual visit via Telephone Visit  The risks and benefits of converting to a virtual visit were discussed in light of the current infectious disease epidemic. Patient also understood that insurance coverage and co-pays are up to their individual insurance plans. HPI:    Erika Ball (:  1980) has requested an audio/video evaluation for the following concern(s): Wife tested positive last week for covid-19. He was given a rapid test Monday which came back negative. He developed symptoms yesterday, sore throat, loss of smell and taste, headache body aches. Symptoms mild at this time. Review of Systems   Constitutional: Negative for activity change, appetite change, chills, diaphoresis, fatigue and fever. HENT: Positive for rhinorrhea and sore throat. Negative for congestion, ear pain, postnasal drip, sinus pressure, sinus pain and trouble swallowing. Last smell and taste    Eyes: Negative for photophobia, pain, discharge, redness, itching and visual disturbance. Respiratory: Negative for cough, chest tightness, shortness of breath and wheezing. Cardiovascular: Negative for chest pain and palpitations. Gastrointestinal: Negative for abdominal distention, abdominal pain, constipation, diarrhea, nausea and vomiting. Genitourinary: Negative for difficulty urinating, dysuria, flank pain, frequency, hematuria and urgency. Musculoskeletal: Positive for myalgias. Negative for arthralgias, back pain, neck pain and neck stiffness. Skin: Negative for color change and rash. Neurological: Positive for headaches. Negative for dizziness, tremors, seizures, syncope, speech difficulty, weakness, light-headedness and numbness.        Prior to Visit Medications Medication Sig Taking? Authorizing Provider   amphetamine-dextroamphetamine (ADDERALL, 30MG,) 30 MG tablet Take 1 tablet by mouth 2 times daily for 30 days. Anaya Romero MD   valACYclovir (VALTREX) 1 g tablet Take 1 tablet by mouth 3 times daily  Anaya Romero MD   ketoconazole (NIZORAL) 2 % cream Apply topically daily.   Anaya Romero MD   Lidocaine 5 % CREA Apply 1 applicator topically 2 times daily as needed (prn)  Patient not taking: Reported on 8/3/2020  Anaya Romero MD       Social History     Tobacco Use    Smoking status: Former Smoker     Years: 10.00     Quit date: 2005     Years since quitting: 15.9    Smokeless tobacco: Never Used   Substance Use Topics    Alcohol use: Yes     Comment: occasional    Drug use: No        No Known Allergies,   Past Medical History:   Diagnosis Date    Abdominal pain, epigastric 3/23/2017    ADHD (attention deficit hyperactivity disorder)     Anxiety state, unspecified     Bipolar disorder, unspecified ([address]     Carpal tunnel syndrome, bilateral     emg pos    Fibromyalgia     dr Jw Fritz    Insomnia, unspecified    ,   Past Surgical History:   Procedure Laterality Date    HERNIA REPAIR     ,   Social History     Tobacco Use    Smoking status: Former Smoker     Years: 10.00     Quit date: 2005     Years since quitting: 15.9    Smokeless tobacco: Never Used   Substance Use Topics    Alcohol use: Yes     Comment: occasional    Drug use: No   ,   Family History   Problem Relation Age of Onset    Cancer Father    ,   Immunization History   Administered Date(s) Administered    Influenza Virus Vaccine 11/05/2014    Influenza, Quadv, IM, (6 mo and older Fluzone, Flulaval, Fluarix and 3 yrs and older Afluria) 10/04/2017    Influenza, Quadv, IM, PF (6 mo and older Fluzone, Flulaval, Fluarix, and 3 yrs and older Afluria) 08/10/2016, 09/19/2019    PPD Test 03/04/2013   ,   Health Maintenance   Topic Date Due    HIV screen  05/09/1994  DTaP/Tdap/Td vaccine (1 - Tdap) 05/09/1998    Lipid screen  05/09/2019    Diabetes screen  05/09/2019    Flu vaccine  Completed    Hepatitis A vaccine  Aged Out    Hepatitis B vaccine  Aged Out    Hib vaccine  Aged Out    Meningococcal (ACWY) vaccine  Aged Out    Pneumococcal 0-64 years Vaccine  Aged Out       PHYSICAL EXAMINATION:  [ INSTRUCTIONS:  \"[x]\" Indicates a positive item  \"[]\" Indicates a negative item  -- DELETE ALL ITEMS NOT EXAMINED]  [x] Alert  [x] Oriented to person/place/time    [x] No apparent distress    [x] Breathing appears normal   [x] Normal Mood      Due to this being a TeleHealth encounter, evaluation of the following organ systems is limited: Vitals/Constitutional/EENT/Resp/CV/GI//MS/Neuro/Skin/Heme-Lymph-Imm. ASSESSMENT/PLAN:  1. Viral URI    - COVID-19 Ambulatory; Future    2. Encounter by telehealth for suspected COVID-19    - COVID-19 Ambulatory; Future  Return in about 1 week (around 12/25/2020), or if symptoms worsen or fail to improve. An  electronic signature was used to authenticate this note. --ÁLVARO Nobles - CNP on 12/18/2020 at 11:29 AM        Pursuant to the emergency declaration under the [facility] waiver authority and the [employer] Act, this Virtual  Visit was conducted, with patient's consent, to reduce the patient's risk of exposure to COVID-19 and provide continuity of care for an established patient. Services were provided through a video synchronous discussion virtually to substitute for in-person clinic visit.

## 2020-12-19 LAB
SARS-COV-2: DETECTED
SOURCE: ABNORMAL

## 2021-02-08 DIAGNOSIS — F90.0 ATTENTION DEFICIT HYPERACTIVITY DISORDER (ADHD), PREDOMINANTLY INATTENTIVE TYPE: ICD-10-CM

## 2021-02-08 RX ORDER — DEXTROAMPHETAMINE SACCHARATE, AMPHETAMINE ASPARTATE, DEXTROAMPHETAMINE SULFATE AND AMPHETAMINE SULFATE 7.5; 7.5; 7.5; 7.5 MG/1; MG/1; MG/1; MG/1
30 TABLET ORAL 2 TIMES DAILY
Qty: 60 TABLET | Refills: 0 | OUTPATIENT
Start: 2021-02-08 | End: 2021-03-10

## 2021-03-15 ENCOUNTER — VIRTUAL VISIT (OUTPATIENT)
Dept: PRIMARY CARE CLINIC | Age: 41
End: 2021-03-15
Payer: COMMERCIAL

## 2021-03-15 DIAGNOSIS — F90.0 ATTENTION DEFICIT HYPERACTIVITY DISORDER (ADHD), PREDOMINANTLY INATTENTIVE TYPE: ICD-10-CM

## 2021-03-15 PROCEDURE — 99213 OFFICE O/P EST LOW 20 MIN: CPT | Performed by: INTERNAL MEDICINE

## 2021-03-15 RX ORDER — DEXTROAMPHETAMINE SACCHARATE, AMPHETAMINE ASPARTATE, DEXTROAMPHETAMINE SULFATE AND AMPHETAMINE SULFATE 7.5; 7.5; 7.5; 7.5 MG/1; MG/1; MG/1; MG/1
30 TABLET ORAL 2 TIMES DAILY
Qty: 60 TABLET | Refills: 0 | Status: SHIPPED | OUTPATIENT
Start: 2021-04-14 | End: 2021-03-15 | Stop reason: SDUPTHER

## 2021-03-15 RX ORDER — DEXTROAMPHETAMINE SACCHARATE, AMPHETAMINE ASPARTATE, DEXTROAMPHETAMINE SULFATE AND AMPHETAMINE SULFATE 7.5; 7.5; 7.5; 7.5 MG/1; MG/1; MG/1; MG/1
30 TABLET ORAL 2 TIMES DAILY
Qty: 60 TABLET | Refills: 0 | Status: SHIPPED | OUTPATIENT
Start: 2021-03-15 | End: 2021-03-15 | Stop reason: SDUPTHER

## 2021-03-15 RX ORDER — DEXTROAMPHETAMINE SACCHARATE, AMPHETAMINE ASPARTATE, DEXTROAMPHETAMINE SULFATE AND AMPHETAMINE SULFATE 7.5; 7.5; 7.5; 7.5 MG/1; MG/1; MG/1; MG/1
30 TABLET ORAL 2 TIMES DAILY
Qty: 60 TABLET | Refills: 0 | Status: SHIPPED | OUTPATIENT
Start: 2021-05-14 | End: 2021-06-28 | Stop reason: SDUPTHER

## 2021-03-15 ASSESSMENT — PATIENT HEALTH QUESTIONNAIRE - PHQ9
SUM OF ALL RESPONSES TO PHQ QUESTIONS 1-9: 0
1. LITTLE INTEREST OR PLEASURE IN DOING THINGS: 0

## 2021-03-15 NOTE — PROGRESS NOTES
Doxy  3/15/2021    TELEHEALTH EVALUATION -- Audio/Visual (During [facility]-24 public health emergency)    Due to [place] outbreak, patient's office visit was converted to a virtual visit. Patient was contacted and agreed to proceed with a virtual visit via [email or web address] me  The risks and benefits of converting to a virtual visit were discussed in light of the current infectious disease epidemic. Patient also understood that insurance coverage and co-pays are up to their individual insurance plans. HPI:    Tati Alcala (: 1980) has requested an audio/video evaluation for the following concern(s):    Mental Health Problem  The primary symptoms include somatic symptoms. The current episode started more than 1 month ago. This is a recurrent problem. Somatic symptoms do not include fatigue. The onset of the illness is precipitated by emotional stress and a stressful event. The degree of incapacity that he is experiencing as a consequence of his illness is moderate. Additional symptoms of the illness include attention impairment and distractible. Additional symptoms of the illness do not include fatigue. Review of Systems   Constitutional: Negative for fatigue and fever. HENT: Negative for postnasal drip. Eyes: Negative for itching. Respiratory: Negative for choking and shortness of breath. Cardiovascular: Negative for chest pain and palpitations. Gastrointestinal: Negative for nausea. Genitourinary: Negative for urgency. Skin: Negative for rash. Neurological: Negative for tremors and syncope. Psychiatric/Behavioral: Negative for suicidal ideas. Prior to Visit Medications    Medication Sig Taking? Authorizing Provider   amphetamine-dextroamphetamine (ADDERALL, 30MG,) 30 MG tablet Take 1 tablet by mouth 2 times daily for 30 days.  Yes Jacqulin Schilder, MD   valACYclovir (VALTREX) 1 g tablet Take 1 tablet by mouth 3 times daily  Jacqulin Schilder, MD   ketoconazole (NIZORAL) 2 % cream Apply ADS [id number] Coleen Barakat NP topically daily. Julianna Sykes MD   Lidocaine 5 % CREA Apply 1 applicator topically 2 times daily as needed (prn)  Patient not taking: Reported on 8/3/2020  Julianna Sykes MD       Social History     Tobacco Use    Smoking status: Former Smoker     Years: 10.00     Quit date:      Years since quittin.2    Smokeless tobacco: Never Used   Substance Use Topics    Alcohol use: Yes     Comment: occasional    Drug use: No        No Known Allergies    PHYSICAL EXAMINATION:  [ INSTRUCTIONS:  \"[x]\" Indicates a positive item  \"[]\" Indicates a negative item  -- DELETE ALL ITEMS NOT EXAMINED]  [] Alert  [] Oriented to person/place/time    [] No apparent distress  [] Toxic appearing    [] Face flushed appearing [] Sclera clear  [] Lips are cyanotic      [] Breathing appears normal  [] Appears tachypneic      [] Rash on visible skin    [] Cranial Nerves II-XII grossly intact    [] Motor grossly intact in visible upper extremities    [] Motor grossly intact in visible lower extremities    [] Normal Mood  [] Anxious appearing    [] Depressed appearing  [] Confused appearing      [] Poor short term memory  [] Poor long term memory    [] OTHER:      Due to this being a TeleHealth encounter, evaluation of the following organ systems is limited: Vitals/Constitutional/EENT/Resp/CV/GI//MS/Neuro/Skin/Heme-Lymph-Imm. ASSESSMENT/PLAN:  1. Attention deficit hyperactivity disorder (ADHD), predominantly inattentive type    - amphetamine-dextroamphetamine (ADDERALL, 30MG,) 30 MG tablet; Take 1 tablet by mouth 2 times daily for 30 days. Dispense: 60 tablet; Refill: 0      Return in about 3 months (around 6/15/2021), or if symptoms worsen or fail to improve. An  electronic signature was used to authenticate this note.     --Julianna Sykes MD on 3/28/2021 at 9:08 AM        Pursuant to the emergency declaration under the [address] [facility] [address] [place] [address] authority and the [facility] and Response Supplemental Appropriations Act, this Virtual  Visit was conducted, with patient's consent, to reduce the patient's risk of exposure to COVID-19 and provide continuity of care for an established patient. Services were provided through a video synchronous discussion virtually to substitute for in-person clinic visit.

## 2021-03-28 ASSESSMENT — ENCOUNTER SYMPTOMS
CHOKING: 0
SHORTNESS OF BREATH: 0
EYE ITCHING: 0
NAUSEA: 0

## 2021-04-02 DIAGNOSIS — B02.9 HERPES ZOSTER WITHOUT COMPLICATION: ICD-10-CM

## 2021-04-02 RX ORDER — VALACYCLOVIR HYDROCHLORIDE 1 G/1
1000 TABLET, FILM COATED ORAL 3 TIMES DAILY
Qty: 21 TABLET | Refills: 0 | Status: SHIPPED | OUTPATIENT
Start: 2021-04-02

## 2021-06-28 ENCOUNTER — OFFICE VISIT (OUTPATIENT)
Dept: PRIMARY CARE CLINIC | Age: 41
End: 2021-06-28
Payer: COMMERCIAL

## 2021-06-28 VITALS
HEIGHT: 68 IN | WEIGHT: 209 LBS | HEART RATE: 79 BPM | TEMPERATURE: 98 F | DIASTOLIC BLOOD PRESSURE: 68 MMHG | OXYGEN SATURATION: 98 % | SYSTOLIC BLOOD PRESSURE: 116 MMHG | BODY MASS INDEX: 31.67 KG/M2

## 2021-06-28 DIAGNOSIS — M79.605 LOW BACK PAIN RADIATING TO BOTH LEGS: ICD-10-CM

## 2021-06-28 DIAGNOSIS — F90.0 ATTENTION DEFICIT HYPERACTIVITY DISORDER (ADHD), PREDOMINANTLY INATTENTIVE TYPE: Primary | ICD-10-CM

## 2021-06-28 DIAGNOSIS — Z51.81 THERAPEUTIC DRUG MONITORING: ICD-10-CM

## 2021-06-28 DIAGNOSIS — M54.50 LOW BACK PAIN RADIATING TO BOTH LEGS: ICD-10-CM

## 2021-06-28 DIAGNOSIS — M79.604 LOW BACK PAIN RADIATING TO BOTH LEGS: ICD-10-CM

## 2021-06-28 DIAGNOSIS — E78.5 HYPERLIPIDEMIA, UNSPECIFIED HYPERLIPIDEMIA TYPE: ICD-10-CM

## 2021-06-28 DIAGNOSIS — R73.9 HYPERGLYCEMIA: ICD-10-CM

## 2021-06-28 PROCEDURE — 99214 OFFICE O/P EST MOD 30 MIN: CPT | Performed by: INTERNAL MEDICINE

## 2021-06-28 RX ORDER — DEXTROAMPHETAMINE SACCHARATE, AMPHETAMINE ASPARTATE, DEXTROAMPHETAMINE SULFATE AND AMPHETAMINE SULFATE 7.5; 7.5; 7.5; 7.5 MG/1; MG/1; MG/1; MG/1
30 TABLET ORAL 2 TIMES DAILY
Qty: 60 TABLET | Refills: 0 | Status: SHIPPED | OUTPATIENT
Start: 2021-07-28 | End: 2021-08-27

## 2021-06-28 RX ORDER — DEXTROAMPHETAMINE SACCHARATE, AMPHETAMINE ASPARTATE, DEXTROAMPHETAMINE SULFATE AND AMPHETAMINE SULFATE 7.5; 7.5; 7.5; 7.5 MG/1; MG/1; MG/1; MG/1
30 TABLET ORAL 2 TIMES DAILY
Qty: 60 TABLET | Refills: 0 | Status: SHIPPED | OUTPATIENT
Start: 2021-06-28 | End: 2021-07-28

## 2021-06-28 RX ORDER — DEXTROAMPHETAMINE SACCHARATE, AMPHETAMINE ASPARTATE, DEXTROAMPHETAMINE SULFATE AND AMPHETAMINE SULFATE 7.5; 7.5; 7.5; 7.5 MG/1; MG/1; MG/1; MG/1
30 TABLET ORAL 2 TIMES DAILY
Qty: 60 TABLET | Refills: 0 | Status: SHIPPED | OUTPATIENT
Start: 2021-08-27 | End: 2021-09-26

## 2021-06-28 SDOH — ECONOMIC STABILITY: TRANSPORTATION INSECURITY
IN THE PAST 12 MONTHS, HAS LACK OF TRANSPORTATION KEPT YOU FROM MEETINGS, WORK, OR FROM GETTING THINGS NEEDED FOR DAILY LIVING?: NO

## 2021-06-28 SDOH — ECONOMIC STABILITY: FOOD INSECURITY: WITHIN THE PAST 12 MONTHS, YOU WORRIED THAT YOUR FOOD WOULD RUN OUT BEFORE YOU GOT MONEY TO BUY MORE.: NEVER TRUE

## 2021-06-28 SDOH — ECONOMIC STABILITY: FOOD INSECURITY: WITHIN THE PAST 12 MONTHS, THE FOOD YOU BOUGHT JUST DIDN'T LAST AND YOU DIDN'T HAVE MONEY TO GET MORE.: NEVER TRUE

## 2021-06-28 SDOH — ECONOMIC STABILITY: TRANSPORTATION INSECURITY
IN THE PAST 12 MONTHS, HAS THE LACK OF TRANSPORTATION KEPT YOU FROM MEDICAL APPOINTMENTS OR FROM GETTING MEDICATIONS?: NO

## 2021-06-28 ASSESSMENT — SOCIAL DETERMINANTS OF HEALTH (SDOH): HOW HARD IS IT FOR YOU TO PAY FOR THE VERY BASICS LIKE FOOD, HOUSING, MEDICAL CARE, AND HEATING?: NOT HARD AT ALL

## 2021-06-28 ASSESSMENT — ENCOUNTER SYMPTOMS
PHOTOPHOBIA: 0
CHOKING: 0
VOICE CHANGE: 0
BACK PAIN: 1
VOMITING: 0
SHORTNESS OF BREATH: 0
TROUBLE SWALLOWING: 0
ABDOMINAL PAIN: 0
NAUSEA: 0

## 2021-06-28 NOTE — PROGRESS NOTES
Bhaskar Allison 42 y.o. male presents today with   Chief Complaint   Patient presents with    ADHD    Medication Refill       Back Pain  This is a recurrent problem. The current episode started more than 1 year ago. The problem occurs daily. The problem has been waxing and waning since onset. The pain is present in the lumbar spine and gluteal. The quality of the pain is described as aching. The pain radiates to the left thigh and right thigh. The pain is at a severity of 6/10. The pain is severe. The symptoms are aggravated by twisting and bending. Associated symptoms include pelvic pain. Pertinent negatives include no abdominal pain, chest pain or fever. Mental Health Problem  The primary symptoms include somatic symptoms. The current episode started more than 1 month ago. This is a recurrent problem. The somatic symptoms began more than 1 month ago. The somatic symptoms have been unchanged since their onset. The symptoms are moderate. Somatic symptoms include back pain. Somatic symptoms do not include fatigue or abdominal pain. Additional symptoms of the illness include attention impairment. Additional symptoms of the illness do not include fatigue or abdominal pain.        Past Medical History:   Diagnosis Date    Abdominal pain, epigastric 3/23/2017    ADHD (attention deficit hyperactivity disorder)     Anxiety state, unspecified     Bipolar disorder, unspecified ([facility] [address]     Carpal tunnel syndrome, bilateral     emg pos    Fibromyalgia     dr Carla Silva    Insomnia, unspecified      Patient Active Problem List    Diagnosis Date Noted    Back pain - 11/29/17 [facility] PM&R, 01/02/18 [facility] PM&R, 12/12/17 Tox Screen positive Hydrocodone, Amphetamines, Marijuana, 09/29/17 Med Contract PM&R  06/06/2013    High risk medication use-Norco 01/03/2018    Leukocytosis 12/16/2017    Carpal tunnel syndrome 11/26/2017    C7 radiculopathy 11/03/2017    Neuropathy of right suprascapular nerve 11/03/2017    Occipital neuralgia of right side 2017    Neuropathy involving both lower extremities 2017    Lumbosacral radiculopathy at L5 2017    Lumbar radiculopathy 10/12/2017    Chronic pain of left knee 2017    Neck pain 2017    DDD (degenerative disc disease), lumbar 2017    TOS (thoracic outlet syndrome) 2017    Medical marijuana use 2017    Abdominal pain, epigastric 2017    Attention deficit 2014    Anxiety state 2013    Attention deficit disorder 2013     Past Surgical History:   Procedure Laterality Date    HERNIA REPAIR       Family History   Problem Relation Age of Onset    Cancer Father      Social History     Socioeconomic History    Marital status:      Spouse name: Dina Orellana Number of children: 3    Years of education: 15    Highest education level: None   Occupational History    Occupation: Security   Tobacco Use    Smoking status: Former Smoker     Years: 10.00     Quit date:      Years since quittin.4    Smokeless tobacco: Never Used   Substance and Sexual Activity    Alcohol use: Yes     Comment: occasional    Drug use: No    Sexual activity: None   Other Topics Concern    None   Social History Narrative    Patient lives in a split level home with his wife and 3 children. Youngest child was diagnoses with Autism Epilepsy  around [id number]. Does do a lot of lifting of the youngest son. ADL's without any assistance. Social Determinants of Health     Financial Resource Strain: Low Risk     Difficulty of Paying Living Expenses: Not hard at all   Food Insecurity: No Food Insecurity    Worried About Running Out of Food in the Last Year: Never true    [place] of Food in the Last Year: Never true   Transportation Needs: No Transportation Needs    Lack of Transportation (Medical): No    Lack of Transportation (Non-Medical):  No   Physical Activity:     Days of Exercise per Week:     Minutes of range of motion. Cervical back: Normal range of motion. Skin:     General: Skin is dry. Neurological:      Mental Status: He is alert. Assessment/Plan  Lilian Corea was seen today for adhd and medication refill. Diagnoses and all orders for this visit:    Attention deficit hyperactivity disorder (ADHD), predominantly inattentive type  -     amphetamine-dextroamphetamine (ADDERALL, 30MG,) 30 MG tablet; Take 1 tablet by mouth 2 times daily for 30 days. -     amphetamine-dextroamphetamine (ADDERALL, 30MG,) 30 MG tablet; Take 1 tablet by mouth 2 times daily for 30 days. -     amphetamine-dextroamphetamine (ADDERALL, 30MG,) 30 MG tablet; Take 1 tablet by mouth 2 times daily for 30 days. Low back pain radiating to both legs  -     Anayeli Scruggs MD, Pain Management, [place]    Therapeutic drug monitoring  -     Urine Drug Screen; Future    Hyperlipidemia, unspecified hyperlipidemia type  -     Lipid, Fasting; Future    Hyperglycemia  -     Hemoglobin A1C; Future        No follow-ups on file.     Vicente Ang MD

## 2021-08-06 ENCOUNTER — INITIAL CONSULT (OUTPATIENT)
Dept: PAIN MANAGEMENT | Age: 41
End: 2021-08-06
Payer: COMMERCIAL

## 2021-08-06 VITALS
TEMPERATURE: 98.2 F | BODY MASS INDEX: 31.67 KG/M2 | HEIGHT: 68 IN | SYSTOLIC BLOOD PRESSURE: 137 MMHG | HEART RATE: 110 BPM | WEIGHT: 209 LBS | RESPIRATION RATE: 12 BRPM | DIASTOLIC BLOOD PRESSURE: 83 MMHG | OXYGEN SATURATION: 98 %

## 2021-08-06 DIAGNOSIS — M43.07 SPONDYLOLYSIS, LUMBOSACRAL: Primary | ICD-10-CM

## 2021-08-06 DIAGNOSIS — M79.604 BILATERAL LEG PAIN: ICD-10-CM

## 2021-08-06 DIAGNOSIS — M54.16 LUMBAR RADICULOPATHY: ICD-10-CM

## 2021-08-06 DIAGNOSIS — M79.605 BILATERAL LEG PAIN: ICD-10-CM

## 2021-08-06 DIAGNOSIS — R06.83 SNORES: ICD-10-CM

## 2021-08-06 PROCEDURE — 99204 OFFICE O/P NEW MOD 45 MIN: CPT | Performed by: PHYSICAL MEDICINE & REHABILITATION

## 2021-08-06 RX ORDER — MELOXICAM 7.5 MG/1
7.5 TABLET ORAL DAILY
Qty: 30 TABLET | Refills: 0 | Status: SHIPPED | OUTPATIENT
Start: 2021-08-06 | End: 2021-09-05

## 2021-08-06 RX ORDER — PREGABALIN 25 MG/1
25 CAPSULE ORAL 2 TIMES DAILY
Qty: 60 CAPSULE | Refills: 0 | Status: SHIPPED | OUTPATIENT
Start: 2021-08-06 | End: 2021-09-05

## 2021-08-06 RX ORDER — LIDOCAINE 40 MG/G
CREAM TOPICAL
Qty: 1 TUBE | Refills: 1 | Status: SHIPPED | OUTPATIENT
Start: 2021-08-06

## 2021-08-06 ASSESSMENT — ENCOUNTER SYMPTOMS
BACK PAIN: 1
NAUSEA: 0
SHORTNESS OF BREATH: 0
DIARRHEA: 0
CONSTIPATION: 0

## 2021-08-06 NOTE — PROGRESS NOTES
Merlin Lakes  (1980)    8/6/2021    Subjective:     Merlin Lakes is 42 y.o. male who complains today of:    Chief Complaint   Patient presents with    Back Pain       Merlin Lakes is a 42 y.o. male who presents for evaluation by request of Dr. Alex Conti for low back pain with pain into both legs. He has struggled with pain for over 1 year, worse over the last 1 month. He denies any immediately-preceding traumatic or inciting events. He has been previously evaluated by Dr Greg Lamar whose records are reviewed below. He describes pain located in both . Pain is a constant ache and is currently a 7/10 and gets up to a 9/10 at its worst and goes down to a 5/10 at its best. Pain is worse with bending and sitting. Pain is better with standing. Pain is located 50% on the right and 50% on the left. Pain is located 20% in the back and 80% in the legs. He denies any numbness, tingling, weakness, bowel or bladder dysfunction, saddle anesthesia, falls, history of cancer, unexplained weight loss, persistent night pain and sweats, fever, IV drug abuse, immunocompromise, chronic prednisone or antibiotic use, or any other red flag symptoms. Mood is down, denies any suicidal or homicidal ideation. Sleep is poor, awakes fatigued, works nights. 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 He served in the [employer].  Used to enjoy spending time with family. Allergies:  Patient has no known allergies. Past Medical History:   Diagnosis Date    Abdominal pain, epigastric 3/23/2017    ADHD (attention deficit hyperactivity disorder)     Anxiety state, unspecified     [facility]     Carpal tunnel syndrome, bilateral     emg pos    Fibromyalgia     dr Alicia Lee Insomnia, unspecified      Past Surgical History:   Procedure Laterality Date    HERNIA REPAIR       Family History   Problem Relation Age of Onset    Cancer Father      Social History     Socioeconomic History    Marital status:      Spouse name: Steve Rowe Number of children: 3    Years of education: 15    Highest education level: Not on file   Occupational History    Occupation: Security   Tobacco Use    Smoking status: Former Smoker     Years: 10.00     Quit date:      Years since quittin.6    Smokeless tobacco: Never Used   Substance and Sexual Activity    Alcohol use: Yes     Comment: occasional    Drug use: No    Sexual activity: Not on file   Other Topics Concern    Not on file   Social History Narrative    Patient lives in a split level home with his wife and 3 children. Youngest child was diagnoses with Autism Epilepsy  around [id number]. Does do a lot of lifting of the youngest son. ADL's without any assistance. Social Determinants of Health     Financial Resource Strain: Low Risk     Difficulty of Paying Living Expenses: Not hard at all   Food Insecurity: No Food Insecurity    Worried About Running Out of Food in the Last Year: Never true    [place] of Food in the Last Year: Never true   Transportation Needs: No Transportation Needs    Lack of Transportation (Medical): No    Lack of Transportation (Non-Medical):  No   Physical Activity:     Days of Exercise per Week:     Minutes of Exercise per Session:    Stress:     Feeling of Stress :    Social Connections:     Frequency of Communication with Friends and Family:     Frequency of Social Gatherings with Friends and Family:     Attends Advent Services:     Active Member of Clubs or Organizations:     Attends Club or Organization Meetings:     Marital Status:    Intimate Partner Violence:     Fear of Current or Ex-Partner:     Emotionally Abused:     Physically Abused:     Sexually Abused:        Current Outpatient Medications on File Prior to Visit   Medication Sig Dispense Refill    amphetamine-dextroamphetamine (ADDERALL, 30MG,) 30 MG tablet Take 1 tablet by mouth 2 times daily for 30 days. 60 tablet 0    amphetamine-dextroamphetamine (ADDERALL, 30MG,) 30 MG tablet Take 1 tablet by mouth 2 times daily for 30 days. 60 tablet 0    [START ON 8/27/2021] amphetamine-dextroamphetamine (ADDERALL, 30MG,) 30 MG tablet Take 1 tablet by mouth 2 times daily for 30 days. 60 tablet 0    valACYclovir (VALTREX) 1 g tablet Take 1 tablet by mouth 3 times daily 21 tablet 0     No current facility-administered medications on file prior to visit. Review of Systems   Constitutional: Negative for fever. HENT: Negative for hearing loss. Respiratory: Negative for shortness of breath. Gastrointestinal: Negative for constipation, diarrhea and nausea. Genitourinary: Negative for difficulty urinating. Musculoskeletal: Positive for back pain. Negative for neck pain. Skin: Negative for rash. Neurological: Negative for headaches. Hematological: Does not bruise/bleed easily. Psychiatric/Behavioral: Negative for sleep disturbance. Objective:     Vitals:  /83 (Site: Right Upper Arm, Position: Sitting, Cuff Size: Large Adult)   Pulse 110   Temp 98.2 °F (36.8 °C) (Temporal)   Resp 12   Ht 5' 8\" (1.727 m)   Wt 209 lb (94.8 kg)   SpO2 98%   BMI 31.78 kg/m² Pain Score:   8      Exam performed under Coronavirus precautions  Gen: No acute distress  Neck: Grossly symmetric without any significant thyromegaly or masses appreciated.   Eyes: No scleral icterus or lid lag appreciated bilaterally. Irises without gross defects bilaterally. HEENT: Hearing grossly intact bilaterally. Normocephalic, external ears and visible portions of nose and mouth atraumatic. Lymph: No gross neck or axillary lymphadenopathy  Cardio: No significant lower extremity edema, pulses intact without significant digit ischemia. Abd: No gross masses or large hernias appreciated. Skin: Visualized skin without any dermatomal rashes or sores. Palpation free of any tightening or subcutaneous nodules. MSK: Gait is antalgic. No significant upper limb digit ischemia appreciated. Psych: Pleasant and cooperative with the history and exam. Mood and Affect normal. Appropriately dressed with good eye contact. Judgement and insight normal. Recent and remote memory intact. Alert and Oriented x3. Neuro: Cranial nerves II-XII grossly intact. No significant pathologic reflexes appreciated. Rises from a seated to standing position with mild difficulty. Gait is antalgic. No assistive devices used. Heel and toe walk intact. Lumbar flexion to 90 degrees, extension to 15 degrees. Limited lumbar spine range of motion. Rotation and extension reproduces axial low back pain. Other facet provocative maneuvers are positive. No gross step offs noted. Tenderness to palpation over the mid to low lumbar spinous processes and bilateral lumbar paraspinals from L2 down to the sacrum. No tenderness over bilateral PSIS. No tenderness over bilateral greater trochanters. No tenderness over bilateral deep gluteal regions.     Sensation grossly intact in both legs except for bilateral S1 paresthesias  Reflexes and strength functional for ambulation, no abnormal reflexes appreciated on exam today  Strength greater than 3/5 bilateral legs  Straight leg raise positive on exam today    Right groin pain          Outside record review:  Review of the original consultation request reveals no specific diagnostic requests or clinical concerns aside from low back pain with pain into both legs that require particular attention. There are no suggested, requested, or specified tests to be ordered or any prior diagnostics performed that require follow-up or further investigation. Dr. Sunshine Godinez 6/28/2021: Low back pain rating to both legs, refer to pain management [facility]. XR L Hip 7/7/20: no fracture, Hip joints, SI joints, and pubic symphysis are preserved. MRI cervical spine 12/20/2017: No spinal canal stenosis, no foraminal narrowing. Negative cervical spine MRI. XR C-spine 11/3/2017: No fractures, disc base is preserved. MRI LS spine 10/10/2017: No fracture, disc heights maintained. T12-L1, L2-L3, L3-L4, L4-L5 unremarkable. L1-L2 normal canal foramen, disc bulge. L5-S1 mild right foraminal narrowing, mild left foraminal narrowing, normal canal, anterolisthesis  XR T-spine, LS-spine, C-spine 9/29/2017: Bilateral L5 spondylolysis, minimal anterolisthesis L5 on S1, mild degenerative changes. No fracture. SI joints intact.   EMG B UE 11/26/17: mild bilateral carpal tunnel syndrome, no neuropathy or cervical radiculopathy  EMG B LE 11/3/17: bilateral lower sensory neuropathy, Bilateral L5 radiculopathy right worse than left    Labs 4/28/2021:  Creatinine 0.94 normal  Platelets 960 normal      Component      Latest Ref Rng & Units 12/12/2017          10:52 AM   Codeine       Not Detected   Morphine       Not Detected   6-Acetylmorphine       Not Detected   Oxycodone       Not Detected   Noroxycodone       Not Detected   Oxymorphone       Not Detected   NOROXYMORPHONE, URINE       Not Detected   Hydrocodone       Present   NORHYDROCODONE, URINE       Present   Hydromorphone       Not Detected   Buprenorphine       Not Detected   Norbuprenorphine       Not Detected   Fentanyl       Not Detected   Norfentanyl       Not Detected   Meperidine       Not Detected   Tapentadol, Urine       Not Detected Tapentadol-O-Sulfate, Urine       Not Detected   Methadone       Not Detected   Propoxyphene       Not Detected   Tramadol       Not Detected   Amphetamine       Present   Methamphetamine       Not Detected   MDMA, Urine       Not Detected   MDA       Not Detected   MDEA       Not Detected   Methylphenidate       Not Detected   Phentermine       Not Detected   Benzoylecgonine       Not Detected   Alprazolam       Not Detected   Alpha-OH-alprazolam       Not Detected   Clonazepam       Not Detected   7-aminoclonazepam       Not Detected   Diazepam Lvl       Not Detected   Nordiazepam Lvl       Not Detected   OXAZEPAM       Not Detected   TEMAZEPAM       Not Detected   Lorazepam       Not Detected   Midazolam       Not Detected   Zolpidem       Not Detected   Barbiturates       Not Detected   Ethyl Glucuronide       Not Detected   Marijuana Metabolite       Present   PCP       Not Detected   CARISOPRODOL       Not Detected       Assessment:      Diagnosis Orders   1. Spondylolysis, lumbosacral  XR LUMBAR SPINE (2-3 VIEWS)    XR LUMBAR SPINE FLEXION AND EXTENSION ONLY    [facility]   2. Bilateral leg pain  XR LUMBAR SPINE (2-3 VIEWS)    XR LUMBAR SPINE FLEXION AND EXTENSION ONLY    [facility]   3. Rodrigo Bautista MD, Pulmonology, [facility] [place]   4. Lumbar radiculopathy  XR LUMBAR SPINE (2-3 VIEWS)    XR LUMBAR SPINE FLEXION AND EXTENSION ONLY    lidocaine (LMX) 4 % cream    meloxicam (MOBIC) 7.5 MG tablet    pregabalin (LYRICA) 25 MG capsule    OR NJX DX/THER SBST INTRLMNR LMBR/SAC W/IMG GDN    [facility]       Plan:     Periodic Controlled Substance Monitoring: Possible medication side effects, risk of tolerance/dependence & alternative treatments discussed., No signs of potential drug abuse or diversion identified. , Assessed functional status., Obtaining appropriate analgesic effect of treatment.  Kartik Dominguez MD)    Orders Placed This Encounter   Medications    lidocaine (LMX) 4 % cream     Sig: Apply a half dollar sized amount to intact skin topically up to twice daily as needed for pain     Dispense:  1 Tube     Refill:  1    meloxicam (MOBIC) 7.5 MG tablet     Sig: Take 1 tablet by mouth daily Take with food, avoid other NSAIDs (Ibuprofen) and steroids     Dispense:  30 tablet     Refill:  0    pregabalin (LYRICA) 25 MG capsule     Sig: Take 1 capsule by mouth 2 times daily for 30 days. Dispense:  60 capsule     Refill:  0       Orders Placed This Encounter   Procedures    XR LUMBAR SPINE (2-3 VIEWS)     Standing Status:   Future     Standing Expiration Date:   8/6/2022     Order Specific Question:   Reason for exam:     Answer:   Please evaluate for the presence of lumbar facet arthropathy    XR LUMBAR SPINE FLEXION AND EXTENSION ONLY     Standing Status:   Future     Standing Expiration Date:   11/4/2021     Order Specific Question:   Reason for exam:     Answer:   Please evaluate for the presence of lumbar facet arthropathy, please evaluate for instability   Nathan Warren MD, Pulmonology, [facility]     Referral Priority:   Routine     Referral Type:   Eval and Treat     Referral Reason:   Specialty Services Required     Referred to Provider:   Eduardo Rai MD     Requested Specialty:   Pulmonology     Number of Visits Requested:   [facility] Physical [facility]     Referral Priority:   Routine     Referral Type:   Eval and Treat     Referral Reason:   Specialty Services Required     Requested Specialty:   Physical Therapy     Number of Visits Requested:   1    [facility]     Standing Status:   Future     Standing Expiration Date:   11/4/2021     Order Specific Question:   Which body part? Answer:   Bilateral lower extremities    [facility] NJX DX/THER SBST INTRLMNR LMBR/SAC W/IMG GDN     Caudal epidural under XR with Dr Ray Ayala.  No anticoagulation, no antibiotics, no diabetes mellitus, no osteoporosis, no bleeding or platelet dysfunction, IV Dye with food, avoid any other NSAIDs or steroids, and discontinue if he develops any concerning symptoms. He should immediately stop the medication and proceed to the emergency department for chest pain, dark urine or difficulty with producing urine, vomiting, abdominal pain or black/tarry stools. The patient expresses understanding of these issues and all questions were answered. Discussed the risks of the above recommended procedures including but not limited to bleeding, infection, worsened pain, damage to surrounding structures, side effects, toxicity, allergic reactions to medications used, immune and stress-response dysfunction, fat necrosis, decreased bone mineralization, cartilage loss, increased fracture risk, avascular necrosis, skin pigmentation changes, blood sugar elevation, need for surgery, premature damage or degeneration of the joint, as well as catastrophic injury such as vision loss, paralysis, stroke, bowel or bladder incontinence, ventilator dependence, loss of use of the joint and/or extremity, and death. Discussed the risks, benefits, alternative procedures, and alternatives to the procedure including no procedure at all. Discussed that we cannot undo any permanent neurologic or orthopaedic damage or change the course of any underlying disease. After thorough discussion, patient expressed complete understanding and willingness to proceed.      Discussed the risks of the above recommended procedures including but not limited to bleeding, infection, worsened pain, damage to surrounding structures, side effects, toxicity, allergic reactions to medications used, immune and stress-response dysfunction, fat necrosis, skin pigmentation changes, blood sugar elevation, headache, vision changes, need for surgery, as well as catastrophic injury such as vision loss, paralysis, stroke, spinal cord and/or plexus infarction or injury, intrathecal injection, spinal cord puncture, arachnoiditis, discitis, bowel or bladder incontinence, ventilator dependence, loss of use of the arms and/or legs, and death. Discussed off-label use of corticosteroids and how the Food and Drug Administration (FDA) has not approved corticosteroids for epidural use. Discussed the risks, benefits, alternative procedures, and alternatives to the procedure including no procedure at all. Discussed that we cannot undo any permanent neurologic damage or change the course of any underlying disease. After thorough discussion, patient expressed understanding and willingness to proceed. Provided education and counseling regarding the diagnosis, prognosis, and treatment options. All questions were answered. Encouraged him to follow-up with his primary care physician and/or specialists as required for his overall health and management of his comorbidities as well as any new positive symptoms mentioned in review of systems above. Care was provided within the definitions and limitations of our specialty practice. Encouraged lifestyle interventions including healthy habits, lifestyle changes, regular aerobic exercise and appropriate weight maintenance as advised by their primary care physician or cardiovascular health provider. Discussed well care and disease prevention/maintenance. All recommendations for therapy are provided to improve function with activities of daily living, decrease pain, and help develop an exercise program. All recommendations for medications are meant to help decrease pain, improve function with activities of daily living, maintain compliance with home exercise program, and improve quality of life. All recommendations for therapeutic injections are meant to help decrease pain, improve function with activities of daily living, maintain compliance with home exercise program, improve quality of life, and decrease reliance upon oral medications.      Encouraged compliance with his home exercise program. Recommended compliance with physical therapy program as outlined above. Discussed the elevated risks of excessive sedation while on pain medications. Advised him against driving or operating heavy machinery or performing any activities where he may harm himself or others while on pain medications. Particular caution was emphasized especially during dose adjustments and medication changes. Discussed the elevated risks of respiratory depression and death while on opioid medications, especially when combined with other sedative substances. Discussed the risks of temporary disability, permanent disability, morbidity, and mortality with poorly-managed or undiagnosed medical conditions and comorbidities. Emphasized the importance of timely medical evaluation and treatment as previously recommended by us or other medical professionals. Risks of not pursing these recommendations were emphasized. Advised him that any lab testing, imaging, or other diagnostic test results are best discussed in person in the office so that we can provide a clear explanation of their significance and best treatment based upon these results. It is his responsibility to make and keep a follow up appointment to discuss these test results in person to discuss the significance of the findings and appropriate follow-up steps. He expressed complete understanding and agreement with the entire plan as outlined above. Portions of this note may have been typed, auto-populated, dictated or transcribed by voice recognition resulting in errors, omissions, or close substitutions which may be missed despite careful proofreading. Please contact the author for any questions or concerns. Thank you Dr. Rebecca Solomon for the opportunity to participate in this patient's care. If you have any questions or concerns, please do not hesitate to contact us. Follow up:  Return in about 1 month (around 9/6/2021) for reassessment of pain and symptoms, [facility] Internal, P.T.  Internal Paloma Dumont MD

## 2021-08-10 ENCOUNTER — TELEPHONE (OUTPATIENT)
Dept: PAIN MANAGEMENT | Age: 41
End: 2021-08-10

## 2021-08-10 NOTE — TELEPHONE ENCOUNTER
Received pa request for Lyrica 25 mg caps from [employer]. Came back stating patient not found.

## 2021-08-11 NOTE — TELEPHONE ENCOUNTER
I received a faxed PA request through email for 'pregabalin (LYRICA) 25 MG' capsule. I tried starting a PA in 'CoverMyMeds' - Response received:  Patient not found. I called patient's pharmacy of:  [facility]     Information was verified. I called [facility] at [phone]. And was told that a PA could not be done using EPA. I was transferred to a pharmacy for Talisha Galeana [address] [place] [address]. Ale did a PA over the phone. Approved starting 08/11/21 - 08/11/22.   Case # [id number]

## 2021-08-18 ENCOUNTER — TELEPHONE (OUTPATIENT)
Dept: PAIN MANAGEMENT | Age: 41
End: 2021-08-18

## 2021-08-20 ENCOUNTER — OFFICE VISIT (OUTPATIENT)
Dept: PAIN MANAGEMENT | Age: 41
End: 2021-08-20
Payer: COMMERCIAL

## 2021-08-20 DIAGNOSIS — M79.604 BILATERAL LEG PAIN: ICD-10-CM

## 2021-08-20 DIAGNOSIS — M79.605 BILATERAL LEG PAIN: ICD-10-CM

## 2021-08-20 PROCEDURE — 95886 MUSC TEST DONE W/N TEST COMP: CPT | Performed by: PHYSICAL MEDICINE & REHABILITATION

## 2021-08-20 PROCEDURE — 95911 NRV CNDJ TEST 9-10 STUDIES: CPT | Performed by: PHYSICAL MEDICINE & REHABILITATION

## 2021-08-20 NOTE — PROGRESS NOTES
Electromyography (EMG)/Nerve conduction studies (NCS) Report: Lower Extremity    Name: Sonia Reyes   YOB: 1980  Date of Service: 8/20/2021   Provider: Radha Nieves MD        INDICATIONS:  Sonia Reyes is a 42 y.o. male who presents for electrodiagnostic evaluation for bilateral leg pain. Both limbs are necessary to examine in order to evaluate for any evidence of systemic disease as well as establish normal baseline values from which to compare any abnormal unilateral findings. The study is explained and verbal consent to proceed is obtained. NERVE CONDUCTION STUDIES:    Sensory nerve conduction studies: Bilateral sural and superficial peroneal sensory nerve conduction studies demonstrate normal distal latencies and amplitudes. Bilateral lower limb temperatures are normal.     Motor nerve conduction studies: Bilateral peroneal motor nerve conduction studies with pickup over the extensor digitorum brevis demonstrate normal distal latencies and amplitudes. Bilateral tibial motor nerve conduction studies with pickup over the abductor hallucis demonstrate normal distal latencies and amplitudes. H reflex: Bilateral H reflexes are difficult to elicit. ELECTROMYOGRAPHY: A disposable monopolar needle is used to evaluate bilateral vastus medialis, tibialis anterior, extensor hallucis longus, flexor digitorum longus, peroneus longus, medial gastrocnemius, and lateral gastrocnemius. All of the muscles sampled are free of any increased insertional activity or any abnormal spontaneous activity. Motor unit recruitment is unremarkable. Bilateral low lumbar paraspinal muscle sampling is free of any increased insertional activity or any abnormal spontaneous activity. SUMMARY:  This study is normal. There is no current electrodiagnostic evidence for an active bilateral lumbosacral motor radiculopathy or generalized large fiber sensorimotor peripheral polyneuropathy.      RECOMMENDATIONS: Today's study does not explain the patient's bilateral leg pain. The patient should follow up as previously instructed. If his symptoms persist or worsen, further electrodiagnostic evaluation may be considered if the patient is agreeable. Clinical correlation is recommended.